# Patient Record
Sex: FEMALE | Race: WHITE | NOT HISPANIC OR LATINO | Employment: FULL TIME | URBAN - METROPOLITAN AREA
[De-identification: names, ages, dates, MRNs, and addresses within clinical notes are randomized per-mention and may not be internally consistent; named-entity substitution may affect disease eponyms.]

---

## 2023-03-01 LAB
HUMAN PAPILLOMAVIRUS (HPV): NORMAL
PAP RECOMMENDATION EXT: NORMAL
PAP SMEAR: NORMAL

## 2024-11-15 ENCOUNTER — PATIENT OUTREACH (OUTPATIENT)
Dept: ADMINISTRATIVE | Facility: HOSPITAL | Age: 44
End: 2024-11-15
Payer: COMMERCIAL

## 2024-11-15 NOTE — PROGRESS NOTES
Population Health Chart Review & Patient Outreach Details      Additional [facility] Health Notes:               Updates Requested / Reviewed:      Updated Care Coordination Note, Care Everywhere, , External Sources: [facility], Care Team Updated, and Immunizations Reconciliation Completed or Queried: [place]         Health Maintenance Topics Overdue:      [facility] Score: 1     Mammogram                       Health Maintenance Topic(s) Outreach Outcomes & Actions Taken:    Cervical Cancer Screening - Outreach Outcomes & Actions Taken  : External Records Uploaded & Care Team Updated if Applicable

## 2024-11-19 NOTE — PROGRESS NOTES
"    SUBJECTIVE:      Patient ID: Ana Alcantara is a 44 y.o. female.    Chief Complaint: Establish Care    HPI  History of Present Illness    CHIEF COMPLAINT:  Ana presents today to establish care and discuss multiple health concerns including thyroid issues, weight gain, and fatigue.    THYROID DISORDER:  She has a history of Hashimoto's thyroiditis that developed post-pregnancy. She is currently taking NP thyroid medication for management.    CARDIOVASCULAR:  She reports a history of heart issues. She underwent a heart ablation procedure, which did not resolve her symptoms. She is currently taking metoprolol twice daily for palpitations, which she finds helpful in managing her symptoms. She can feel when the medication is wearing off, particularly when her dosing schedule is disrupted. She expresses frustration that the ablation did not decrease her palpitations, but acknowledges that the increased metoprolol dosage has been beneficial in controlling her symptoms.    FATIGUE AND JOINT PAIN:  She reports significant fatigue and joint pain associated with physical activity. Any physical exertion results in severe exhaustion lasting for two days. She also experiences intense joint pain following physical activity. These symptoms have led to a cessation of regular exercise.    WEIGHT MANAGEMENT:  She reports weight gain and acknowledges the need to increase water intake and consume more protein in her diet.    MENSTRUAL IRREGULARITIES:  She reports a history of heavy menstrual cycles. Her last cycle occurred in October, and she has missed her period since then. She expresses concern about possible perimenopausal symptoms, including hot flashes described as feeling "super hot from the inside." She denies current pregnancy, having taken two negative pregnancy tests since missing her November period. She also reports recent nausea but states it feels different from her previous pregnancy experiences. She has not yet " discussed these symptoms with her gynecologist.    MENTAL HEALTH:  She has a history of anxiety and postpartum depression. She has been on duloxetine since experiencing these conditions, which has been effective for her after trying a few different medications initially. Her anxiety may have been related to palpitations rather than mental anxiety, as she did not feel worried or anxious about anything specific when experiencing rapid heart rate.    MEDICAL HISTORY:  She has a history of gestational diabetes. She reports feeling overwhelmed with numerous doctor visits over the past two years, leading to a sense of burnout.    FAMILY HISTORY:  She denies any known genetic disorders in her immediate family. Her parents are both alive and in good health for their age.    DIET:  She has tried the Autoimmune Protocol (AIP) diet, which she describes as consisting primarily of protein, vegetables, and some fruits. She has not specifically tried the Mediterranean diet.    MEDICATIONS:  She is currently taking duloxetine for anxiety, metoprolol for palpitations, NP thyroid for Hashimoto's thyroiditis,   and progesterone prescribed by her gynecologist for low progesterone levels.         Past Surgical History:   Procedure Laterality Date    ABLATION  2024     SECTION      DILATION AND CURETTAGE OF UTERUS       Family History   Problem Relation Name Age of Onset    Heart disease Father      Diabetes Father      Diabetes Paternal Grandmother      Heart disease Paternal Grandfather        Social History     Socioeconomic History    Marital status:    Tobacco Use    Smoking status: Never     Passive exposure: Never    Smokeless tobacco: Never   Substance and Sexual Activity    Alcohol use: Yes    Drug use: Never    Sexual activity: Yes     Social Drivers of Health     Financial Resource Strain: Low Risk  (2024)    Overall Financial Resource Strain (CARDIA)     Difficulty of Paying Living Expenses: Not  very hard   Food Insecurity: No Food Insecurity (2024)    Hunger Vital Sign     Worried About Running Out of Food in the Last Year: Never true     Ran Out of Food in the Last Year: Never true   Transportation Needs: No Transportation Needs (2023)    Received from [facility] - Transportation     Lack of Transportation (Medical): No     Lack of Transportation (Non-Medical): No   Physical Activity: Unknown (2024)    Exercise Vital Sign     Days of Exercise per Week: 0 days   Stress: No Stress Concern Present (2024)    [employer] [place] [employer] - Occupational Stress Questionnaire     Feeling of Stress : Only a little   Housing Stability: Unknown (2024)    Housing Stability Vital Sign     Unable to Pay for Housing in the Last Year: No     Current Outpatient Medications   Medication Sig Dispense Refill    DULoxetine (CYMBALTA) 30 MG capsule Take 30 mg by mouth once daily.      NP THYROID 30 mg Tab Take 30 mg by mouth once daily.      progesterone (PROMETRIUM) 200 MG capsule TAKE 1 CAPSULE BY MOUTH ONCE DAILY AT BEDTIME FOR 10 DAYS AFTER OVULATION      metoprolol tartrate (LOPRESSOR) 25 MG tablet Take 25 mg by mouth 2 (two) times daily.      tirzepatide, weight loss, (ZEPBOUND) 2.5 mg/0.5 mL PnIj Inject 2.5 mg into the skin every 7 days. 4 Pen 0     No current facility-administered medications for this visit.     Review of patient's allergies indicates:  No Known Allergies   Past Medical History:   Diagnosis Date    Anxiety     Hypothyroidism     Pregnancy diabetes      Past Surgical History:   Procedure Laterality Date    ABLATION  2024     SECTION      DILATION AND CURETTAGE OF UTERUS         Review of Systems   Constitutional:  Positive for fatigue and unexpected weight change. Negative for activity change, appetite change, chills and diaphoresis.   HENT:  Negative for ear discharge, hearing loss, rhinorrhea, trouble swallowing and voice change.   "  Eyes:  Negative for photophobia, pain, discharge and visual disturbance.   Respiratory:  Negative for chest tightness, shortness of breath, wheezing and stridor.    Cardiovascular:  Negative for chest pain and palpitations.   Gastrointestinal:  Negative for abdominal pain, blood in stool, constipation, diarrhea and vomiting.   Endocrine: Negative for cold intolerance, heat intolerance, polydipsia and polyuria.   Genitourinary:  Negative for difficulty urinating, dysuria, flank pain, hematuria and menstrual problem.   Musculoskeletal:  Positive for arthralgias. Negative for joint swelling, neck pain and neck stiffness.   Skin:  Negative for pallor.   Neurological:  Positive for weakness. Negative for dizziness, speech difficulty and headaches.   Hematological:  Does not bruise/bleed easily.   Psychiatric/Behavioral:  Negative for confusion, dysphoric mood, self-injury, sleep disturbance and suicidal ideas. The patient is not nervous/anxious.       OBJECTIVE:      Vitals:    11/20/24 0858   BP: 120/78   Pulse: 60   SpO2: 99%   Weight: 91.2 kg (201 lb)   Height: 5' 2" (1.575 m)     Physical Exam  Vitals and nursing note reviewed.   Constitutional:       General: She is not in acute distress.     Appearance: She is well-developed.   HENT:      Head: Normocephalic and atraumatic.      Right Ear: Tympanic membrane normal.      Left Ear: Tympanic membrane normal.      Nose: Nose normal.      Mouth/Throat:      Pharynx: Uvula midline.   Eyes:      General: Lids are normal.      Conjunctiva/sclera: Conjunctivae normal.      Pupils: Pupils are equal, round, and reactive to light.      Right eye: Pupil is round and reactive.      Left eye: Pupil is round and reactive.   Neck:      Thyroid: No thyromegaly.      Vascular: No JVD.      Trachea: Trachea normal.   Cardiovascular:      Rate and Rhythm: Normal rate and regular rhythm.      Pulses: Normal pulses.      Heart sounds: Normal heart sounds.   Pulmonary:      Effort: " Pulmonary effort is normal. No tachypnea or respiratory distress.      Breath sounds: Normal breath sounds.   Abdominal:      General: Bowel sounds are normal.      Palpations: Abdomen is soft.      Tenderness: There is no abdominal tenderness.   Musculoskeletal:         General: Normal range of motion.      Cervical back: Normal range of motion and neck supple.   Lymphadenopathy:      Cervical: No cervical adenopathy.   Skin:     General: Skin is warm and dry.      Findings: No rash.   Neurological:      Mental Status: She is alert and oriented to person, place, and time.   Psychiatric:         Mood and Affect: Mood normal.         Speech: Speech normal.         Behavior: Behavior normal. Behavior is cooperative.         Thought Content: Thought content normal.       Patient Outreach on 11/15/2024   Component Date Value Ref Range Status    PAP Recommendation External 03/01/2023 Pap in 5 years   Final    Pap 03/01/2023 Negative for intraephithelial lesion or malignancy  Negative for intraephithelial lesion or malignancy, Other Final    HPV DNA 03/01/2023 None Detected  None Detected Final        Last visit note, most recent available labs, and health maintenance reviewed    Assessment:       1. Fatigue, unspecified type    2. Hypothyroidism, unspecified type    3. Anemia, unspecified type    4. Vitamin D deficiency    5. Absence of menstruation    6. Need for hepatitis C screening test    7. Screening mammogram for breast cancer    8. BMI 36.0-36.9,adult    9. History of gestational diabetes    10. Hashimoto thyroiditis    11. Preventative health care        Plan:       Fatigue, unspecified type  -     Vitamin B12; Future; Expected date: 11/20/2024    Hypothyroidism, unspecified type  -     TSH; Future; Expected date: 01/01/2025  -     T4, Free; Future; Expected date: 11/20/2024  -     T3, Free; Future; Expected date: 11/20/2024    Anemia, unspecified type  -     Vitamin B12; Future; Expected date: 11/20/2024  -      Ferritin; Future; Expected date: 11/20/2024  -     Iron; Future; Expected date: 11/20/2024    Vitamin D deficiency  -     Vitamin D; Future; Expected date: 12/04/2024    Absence of menstruation  -     Pregnancy, urine rapid; Future; Expected date: 11/20/2024    Need for hepatitis C screening test  -     HEPATITIS C ANTIBODY; Future; Expected date: 11/20/2024    Screening mammogram for breast cancer  -     Mammo Digital Screening Bilat w/ Derrek; Future; Expected date: 11/20/2024    BMI 36.0-36.9,adult  -     Discontinue: tirzepatide, weight loss, (ZEPBOUND) 2.5 mg/0.5 mL PnIj; Inject 2.5 mg into the skin every 7 days.  Dispense: 4 Pen; Refill: 0  -     tirzepatide, weight loss, (ZEPBOUND) 2.5 mg/0.5 mL PnIj; Inject 2.5 mg into the skin every 7 days.  Dispense: 4 Pen; Refill: 0    History of gestational diabetes  -     Discontinue: tirzepatide, weight loss, (ZEPBOUND) 2.5 mg/0.5 mL PnIj; Inject 2.5 mg into the skin every 7 days.  Dispense: 4 Pen; Refill: 0  -     tirzepatide, weight loss, (ZEPBOUND) 2.5 mg/0.5 mL PnIj; Inject 2.5 mg into the skin every 7 days.  Dispense: 4 Pen; Refill: 0    Hashimoto thyroiditis  -     Discontinue: tirzepatide, weight loss, (ZEPBOUND) 2.5 mg/0.5 mL PnIj; Inject 2.5 mg into the skin every 7 days.  Dispense: 4 Pen; Refill: 0  -     tirzepatide, weight loss, (ZEPBOUND) 2.5 mg/0.5 mL PnIj; Inject 2.5 mg into the skin every 7 days.  Dispense: 4 Pen; Refill: 0    Preventative health care  -     CBC Auto Differential; Future; Expected date: 12/04/2024  -     Comprehensive Metabolic Panel; Future; Expected date: 12/04/2024  -     Lipid Panel; Future; Expected date: 12/04/2024  -     TSH; Future; Expected date: 01/01/2025  -     Urinalysis, Reflex to Urine Culture Urine, Clean Catch; Future; Expected date: 11/20/2024      Assessment & Plan    HYPOTHYROIDISM AND AUTOIMMUNE THYROIDITIS:  - Consider adding Synthroid (T4) to address T4 deficiency and improve symptoms.  - Explained the role of T4  and T3 in thyroid function and why NP thyroid alone may be insufficient for treating Hashimoto's hypothyroidism.  - Consider starting Synthroid (T4) pending lab results.  - Thyroid panel (TSH, free T4, T3) ordered.    MENSTRUAL IRREGULARITIES AND MENOPAUSAL SYMPTOMS:  - Evaluate need for progesterone treatment.  - Assess for potential perimenopause given missed menstrual cycle and reported hot flashes.    WEIGHT MANAGEMENT:  - Consider weight loss medication (Mounjaro/Wegovy) pending insurance approval and further evaluation.  - Potentially start Mounjaro or Wegovy for weight management, pending insurance approval: Start at lowest dose (2.5 mg for Mounjaro, 0.25 mg for Wegovy), Titrate dose every 4 weeks as tolerated, Administer via subcutaneous injection in abdomen, rotating injection sites.    NUTRITIONAL DEFICIENCIES AND ANEMIA:  - Investigate B12 levels, vitamin D status, and iron/ferritin given history of anemia.  - B12 level ordered.  - Vitamin D level ordered.  - Iron studies (including ferritin) ordered.    GENERAL HEALTH AND LIFESTYLE RECOMMENDATIONS:  - Discussed the importance of adequate hydration for overall health and symptom management.  - Provided information on the Mediterranean diet as a potential beneficial eating pattern.  - Ana to increase water intake.  - Ana to review provided Mediterranean diet information.  - Recommend increasing protein intake.    LABS:  - Full comprehensive panel ordered.  - Cholesterol panel ordered.  - Pregnancy test ordered.    FOLLOW-UP:  - Follow up to review lab results and discuss medication changes.  - Contact the office if experiencing side effects from new medications (if started), including constipation, nausea, or other GI issues.         Follow up in about 4 weeks (around 12/18/2024) for hypothyroidism .  This note was generated with the assistance of ambient listening technology. Verbal consent was obtained by the patient and accompanying visitor(s) for  the recording of patient appointment to facilitate this note. I attest to having reviewed and edited the generated note for accuracy, though some syntax or spelling errors may persist. Please contact the author of this note for any clarification.        11/20/2024 MYLSE Nassar, FNP

## 2024-11-20 ENCOUNTER — OFFICE VISIT (OUTPATIENT)
Dept: FAMILY MEDICINE | Facility: CLINIC | Age: 44
End: 2024-11-20
Payer: COMMERCIAL

## 2024-11-20 VITALS
BODY MASS INDEX: 36.99 KG/M2 | HEIGHT: 62 IN | WEIGHT: 201 LBS | HEART RATE: 60 BPM | OXYGEN SATURATION: 99 % | DIASTOLIC BLOOD PRESSURE: 78 MMHG | SYSTOLIC BLOOD PRESSURE: 120 MMHG

## 2024-11-20 DIAGNOSIS — D64.9 ANEMIA, UNSPECIFIED TYPE: ICD-10-CM

## 2024-11-20 DIAGNOSIS — Z12.31 SCREENING MAMMOGRAM FOR BREAST CANCER: ICD-10-CM

## 2024-11-20 DIAGNOSIS — E55.9 VITAMIN D DEFICIENCY: ICD-10-CM

## 2024-11-20 DIAGNOSIS — Z11.59 NEED FOR HEPATITIS C SCREENING TEST: ICD-10-CM

## 2024-11-20 DIAGNOSIS — E06.3 HASHIMOTO THYROIDITIS: ICD-10-CM

## 2024-11-20 DIAGNOSIS — E03.9 HYPOTHYROIDISM, UNSPECIFIED TYPE: ICD-10-CM

## 2024-11-20 DIAGNOSIS — R53.83 FATIGUE, UNSPECIFIED TYPE: Primary | ICD-10-CM

## 2024-11-20 DIAGNOSIS — Z86.32 HISTORY OF GESTATIONAL DIABETES: ICD-10-CM

## 2024-11-20 DIAGNOSIS — Z00.00 PREVENTATIVE HEALTH CARE: ICD-10-CM

## 2024-11-20 DIAGNOSIS — N91.2 ABSENCE OF MENSTRUATION: ICD-10-CM

## 2024-11-20 RX ORDER — METOPROLOL TARTRATE 25 MG/1
25 TABLET, FILM COATED ORAL 2 TIMES DAILY
COMMUNITY

## 2024-11-20 RX ORDER — TIRZEPATIDE 2.5 MG/.5ML
2.5 INJECTION, SOLUTION SUBCUTANEOUS
Qty: 4 PEN | Refills: 0 | Status: SHIPPED | OUTPATIENT
Start: 2024-11-20 | End: 2024-11-20 | Stop reason: SDUPTHER

## 2024-11-20 RX ORDER — TIRZEPATIDE 2.5 MG/.5ML
2.5 INJECTION, SOLUTION SUBCUTANEOUS
Qty: 4 PEN | Refills: 0 | Status: SHIPPED | OUTPATIENT
Start: 2024-11-20

## 2024-11-21 ENCOUNTER — PATIENT MESSAGE (OUTPATIENT)
Dept: FAMILY MEDICINE | Facility: CLINIC | Age: 44
End: 2024-11-21
Payer: COMMERCIAL

## 2024-11-21 DIAGNOSIS — E06.3 HASHIMOTO THYROIDITIS: ICD-10-CM

## 2024-11-21 DIAGNOSIS — Z86.32 HISTORY OF GESTATIONAL DIABETES: ICD-10-CM

## 2024-11-21 DIAGNOSIS — I10 HYPERTENSION, UNSPECIFIED TYPE: ICD-10-CM

## 2024-11-21 DIAGNOSIS — I49.9 SUPRAVENTRICULAR ARRHYTHMIA: Primary | ICD-10-CM

## 2024-11-21 DIAGNOSIS — Z82.49 FAMILY HISTORY OF HEART DISEASE: ICD-10-CM

## 2024-11-21 DIAGNOSIS — E78.5 HYPERLIPIDEMIA, UNSPECIFIED HYPERLIPIDEMIA TYPE: ICD-10-CM

## 2024-11-21 LAB
25(OH)D3+25(OH)D2 SERPL-MCNC: 32 NG/ML (ref 30–100)
ALBUMIN SERPL-MCNC: 4.2 G/DL (ref 3.6–5.1)
ALBUMIN/GLOB SERPL: 1.7 (CALC) (ref 1–2.5)
ALP SERPL-CCNC: 41 U/L (ref 31–125)
ALT SERPL-CCNC: 17 U/L (ref 6–29)
APPEARANCE UR: CLEAR
AST SERPL-CCNC: 19 U/L (ref 10–30)
B-HCG UR QL: NEGATIVE
BACTERIA #/AREA URNS HPF: ABNORMAL /HPF
BACTERIA UR CULT: ABNORMAL
BASOPHILS # BLD AUTO: 40 CELLS/UL (ref 0–200)
BASOPHILS NFR BLD AUTO: 0.8 %
BILIRUB SERPL-MCNC: 0.6 MG/DL (ref 0.2–1.2)
BILIRUB UR QL STRIP: NEGATIVE
BUN SERPL-MCNC: 14 MG/DL (ref 7–25)
BUN/CREAT SERPL: NORMAL (CALC) (ref 6–22)
CALCIUM SERPL-MCNC: 9.2 MG/DL (ref 8.6–10.2)
CHLORIDE SERPL-SCNC: 101 MMOL/L (ref 98–110)
CHOLEST SERPL-MCNC: 218 MG/DL
CHOLEST/HDLC SERPL: 4.1 (CALC)
CO2 SERPL-SCNC: 30 MMOL/L (ref 20–32)
COLOR UR: YELLOW
CREAT SERPL-MCNC: 0.96 MG/DL (ref 0.5–0.99)
EGFR: 75 ML/MIN/1.73M2
EOSINOPHIL # BLD AUTO: 315 CELLS/UL (ref 15–500)
EOSINOPHIL NFR BLD AUTO: 6.3 %
ERYTHROCYTE [DISTWIDTH] IN BLOOD BY AUTOMATED COUNT: 11.7 % (ref 11–15)
FERRITIN SERPL-MCNC: 27 NG/ML (ref 16–232)
GLOBULIN SER CALC-MCNC: 2.5 G/DL (CALC) (ref 1.9–3.7)
GLUCOSE SERPL-MCNC: 99 MG/DL (ref 65–99)
GLUCOSE UR QL STRIP: NEGATIVE
HCT VFR BLD AUTO: 44.2 % (ref 35–45)
HCV AB SERPL QL IA: NORMAL
HDLC SERPL-MCNC: 53 MG/DL
HGB BLD-MCNC: 15.1 G/DL (ref 11.7–15.5)
HGB UR QL STRIP: NEGATIVE
HYALINE CASTS #/AREA URNS LPF: ABNORMAL /LPF
IRON SERPL-MCNC: 155 MCG/DL (ref 40–190)
KETONES UR QL STRIP: NEGATIVE
LDLC SERPL CALC-MCNC: 142 MG/DL (CALC)
LEUKOCYTE ESTERASE UR QL STRIP: NEGATIVE
LYMPHOCYTES # BLD AUTO: 1420 CELLS/UL (ref 850–3900)
LYMPHOCYTES NFR BLD AUTO: 28.4 %
MCH RBC QN AUTO: 33.2 PG (ref 27–33)
MCHC RBC AUTO-ENTMCNC: 34.2 G/DL (ref 32–36)
MCV RBC AUTO: 97.1 FL (ref 80–100)
MONOCYTES # BLD AUTO: 375 CELLS/UL (ref 200–950)
MONOCYTES NFR BLD AUTO: 7.5 %
NEUTROPHILS # BLD AUTO: 2850 CELLS/UL (ref 1500–7800)
NEUTROPHILS NFR BLD AUTO: 57 %
NITRITE UR QL STRIP: NEGATIVE
NONHDLC SERPL-MCNC: 165 MG/DL (CALC)
PH UR STRIP: 6.5 [PH] (ref 5–8)
PLATELET # BLD AUTO: 176 THOUSAND/UL (ref 140–400)
PMV BLD REES-ECKER: 10 FL (ref 7.5–12.5)
POTASSIUM SERPL-SCNC: 4.6 MMOL/L (ref 3.5–5.3)
PROT SERPL-MCNC: 6.7 G/DL (ref 6.1–8.1)
PROT UR QL STRIP: NEGATIVE
RBC # BLD AUTO: 4.55 MILLION/UL (ref 3.8–5.1)
RBC #/AREA URNS HPF: ABNORMAL /HPF
SERVICE CMNT-IMP: ABNORMAL
SODIUM SERPL-SCNC: 139 MMOL/L (ref 135–146)
SP GR UR STRIP: 1.02 (ref 1–1.03)
SQUAMOUS #/AREA URNS HPF: ABNORMAL /HPF
T3FREE SERPL-MCNC: 3.6 PG/ML (ref 2.3–4.2)
T4 FREE SERPL-MCNC: 0.9 NG/DL (ref 0.8–1.8)
TRIGL SERPL-MCNC: 112 MG/DL
TSH SERPL-ACNC: 0.32 MIU/L
VIT B12 SERPL-MCNC: 1014 PG/ML (ref 200–1100)
WBC # BLD AUTO: 5 THOUSAND/UL (ref 3.8–10.8)
WBC #/AREA URNS HPF: ABNORMAL /HPF

## 2024-11-22 RX ORDER — TIRZEPATIDE 2.5 MG/.5ML
2.5 INJECTION, SOLUTION SUBCUTANEOUS
Qty: 4 PEN | Refills: 0 | Status: SHIPPED | OUTPATIENT
Start: 2024-11-22 | End: 2024-11-25 | Stop reason: ALTCHOICE

## 2024-11-22 NOTE — TELEPHONE ENCOUNTER
Zepbound sent. Hold np thyroid so we can get baseline thyroid labs and she can have repeat tsh, FT3 and FT4 here on her appt day

## 2024-11-25 ENCOUNTER — PATIENT MESSAGE (OUTPATIENT)
Dept: FAMILY MEDICINE | Facility: CLINIC | Age: 44
End: 2024-11-25
Payer: COMMERCIAL

## 2024-11-25 DIAGNOSIS — I49.9 SUPRAVENTRICULAR ARRHYTHMIA: ICD-10-CM

## 2024-11-25 DIAGNOSIS — I10 HYPERTENSION, UNSPECIFIED TYPE: ICD-10-CM

## 2024-11-25 DIAGNOSIS — E06.3 HASHIMOTO THYROIDITIS: ICD-10-CM

## 2024-11-25 DIAGNOSIS — Z82.49 FAMILY HISTORY OF HEART DISEASE: Primary | ICD-10-CM

## 2024-11-25 DIAGNOSIS — E78.5 HYPERLIPIDEMIA, UNSPECIFIED HYPERLIPIDEMIA TYPE: ICD-10-CM

## 2024-11-25 RX ORDER — SEMAGLUTIDE 0.25 MG/.5ML
0.25 INJECTION, SOLUTION SUBCUTANEOUS
Qty: 2 ML | Refills: 0 | Status: SHIPPED | OUTPATIENT
Start: 2024-11-25

## 2024-11-25 NOTE — TELEPHONE ENCOUNTER
Ms. Monae ARELLANO  Can we possibly appeal with chart notes and labs for the original denial of ZEPBOUND?

## 2024-12-02 ENCOUNTER — HOSPITAL ENCOUNTER (OUTPATIENT)
Dept: RADIOLOGY | Facility: CLINIC | Age: 44
Discharge: HOME | End: 2024-12-02
Attending: NURSE PRACTITIONER
Payer: COMMERCIAL

## 2024-12-02 DIAGNOSIS — Z12.31 SCREENING MAMMOGRAM FOR BREAST CANCER: ICD-10-CM

## 2024-12-02 PROCEDURE — 77067 SCR MAMMO BI INCL CAD: CPT | Mod: TC,PO

## 2024-12-02 PROCEDURE — 77067 SCR MAMMO BI INCL CAD: CPT | Mod: 26,,, | Performed by: RADIOLOGY

## 2024-12-02 PROCEDURE — 77063 BREAST TOMOSYNTHESIS BI: CPT | Mod: 26,,, | Performed by: RADIOLOGY

## 2024-12-02 NOTE — TELEPHONE ENCOUNTER
Zepbound denied because it says she has to try wegovy or saxenda first and has to have exercised 150 minutes per week x 6mo and cont to exercise 150min/week during treatment. Was the wegovy denied as well? If so Is she meeting the exercise requirement?

## 2024-12-11 ENCOUNTER — PATIENT MESSAGE (OUTPATIENT)
Dept: FAMILY MEDICINE | Facility: CLINIC | Age: 44
End: 2024-12-11
Payer: COMMERCIAL

## 2024-12-23 ENCOUNTER — OFFICE VISIT (OUTPATIENT)
Dept: FAMILY MEDICINE | Facility: CLINIC | Age: 44
End: 2024-12-23
Payer: COMMERCIAL

## 2024-12-23 VITALS — BODY MASS INDEX: 37.73 KG/M2 | HEIGHT: 62 IN | HEART RATE: 70 BPM | OXYGEN SATURATION: 99 % | WEIGHT: 205 LBS

## 2024-12-23 DIAGNOSIS — E03.9 HYPOTHYROIDISM, UNSPECIFIED TYPE: ICD-10-CM

## 2024-12-23 DIAGNOSIS — E06.3 HASHIMOTO THYROIDITIS: Primary | ICD-10-CM

## 2024-12-23 DIAGNOSIS — D64.9 ANEMIA, UNSPECIFIED TYPE: ICD-10-CM

## 2024-12-23 DIAGNOSIS — Z87.59 HISTORY OF MISCARRIAGE: ICD-10-CM

## 2024-12-23 DIAGNOSIS — R53.83 FATIGUE, UNSPECIFIED TYPE: ICD-10-CM

## 2024-12-23 PROCEDURE — 3044F HG A1C LEVEL LT 7.0%: CPT | Mod: CPTII,S$GLB,, | Performed by: NURSE PRACTITIONER

## 2024-12-23 PROCEDURE — 1160F RVW MEDS BY RX/DR IN RCRD: CPT | Mod: CPTII,S$GLB,, | Performed by: NURSE PRACTITIONER

## 2024-12-23 PROCEDURE — 99214 OFFICE O/P EST MOD 30 MIN: CPT | Mod: S$GLB,,, | Performed by: NURSE PRACTITIONER

## 2024-12-23 PROCEDURE — 3008F BODY MASS INDEX DOCD: CPT | Mod: CPTII,S$GLB,, | Performed by: NURSE PRACTITIONER

## 2024-12-23 PROCEDURE — 1159F MED LIST DOCD IN RCRD: CPT | Mod: CPTII,S$GLB,, | Performed by: NURSE PRACTITIONER

## 2024-12-23 RX ORDER — SEMAGLUTIDE 0.5 MG/.5ML
0.5 INJECTION, SOLUTION SUBCUTANEOUS
Qty: 2 ML | Refills: 0 | Status: SHIPPED | OUTPATIENT
Start: 2024-12-23

## 2024-12-23 NOTE — PROGRESS NOTES
SUBJECTIVE:      Patient ID: Ana Alcantara is a 44 y.o. female.    Chief Complaint: Hypothyroidism    HPI  History of Present Illness    CHIEF COMPLAINT:  Ana presents today for follow-up on weight management and thyroid issues.    WEIGHT MANAGEMENT:  She reports positive response to Wegovy 0.25 mg, has taken 2 doses without side effects    THYROID:  She discontinued NP thyroid medication 4 weeks ago. Due for follow up labs    MTHFR GENE MUTATION AND REPRODUCTIVE HISTORY:  She is unsure about prior MTHFR gene mutation testing. She reports symptoms including fatigue, ADHD, and brain fog. She has a history of six miscarriages.    LABS:  B12 levels, ferritin, iron stores, kidney, and liver function are normal. Cholesterol is elevated.    CURRENT MEDICATIONS:  She is currently weaning off Cymbalta and taking B12, vitamin D, magnesium, and prenatal vitamins.         Past Surgical History:   Procedure Laterality Date    ABLATION  2024     SECTION      DILATION AND CURETTAGE OF UTERUS       Family History   Problem Relation Name Age of Onset    Heart disease Father      Diabetes Father      Diabetes Paternal Grandmother      Heart disease Paternal Grandfather        Social History     Socioeconomic History    Marital status:    Tobacco Use    Smoking status: Never     Passive exposure: Never    Smokeless tobacco: Never   Substance and Sexual Activity    Alcohol use: Yes    Drug use: Never    Sexual activity: Yes     Social Drivers of Health     Financial Resource Strain: Low Risk  (2024)    Overall Financial Resource Strain (CARDIA)     Difficulty of Paying Living Expenses: Not very hard   Food Insecurity: No Food Insecurity (2024)    Hunger Vital Sign     Worried About Running Out of Food in the Last Year: Never true     Ran Out of Food in the Last Year: Never true   Transportation Needs: No Transportation Needs (2023)    Received from [facility] - Transportation      Lack of Transportation (Medical): No     Lack of Transportation (Non-Medical): No   Physical Activity: Unknown (2024)    Exercise Vital Sign     Days of Exercise per Week: 0 days   Stress: No Stress Concern Present (2024)    [employer] [place] [facility] - Occupational Stress Questionnaire     Feeling of Stress : Only a little   Housing Stability: Unknown (2024)    Housing Stability Vital Sign     Unable to Pay for Housing in the Last Year: No     Current Outpatient Medications   Medication Sig Dispense Refill    DULoxetine (CYMBALTA) 30 MG capsule Take 30 mg by mouth once daily.      metoprolol tartrate (LOPRESSOR) 25 MG tablet Take 25 mg by mouth 2 (two) times daily.      semaglutide, weight loss, (WEGOVY) 0.5 mg/0.5 mL PnIj Inject 0.5 mg into the skin every 7 days. 2 mL 0     No current facility-administered medications for this visit.     Review of patient's allergies indicates:  No Known Allergies   Past Medical History:   Diagnosis Date    Anxiety     Hypothyroidism     Pregnancy diabetes     Supraventricular arrhythmia      Past Surgical History:   Procedure Laterality Date    ABLATION  2024     SECTION      DILATION AND CURETTAGE OF UTERUS         Review of Systems   Constitutional:  Positive for fatigue. Negative for activity change, appetite change, chills, diaphoresis and unexpected weight change.   HENT:  Negative for ear discharge, hearing loss, rhinorrhea, trouble swallowing and voice change.    Eyes:  Negative for photophobia, pain, discharge and visual disturbance.   Respiratory:  Negative for cough, chest tightness, shortness of breath, wheezing and stridor.    Cardiovascular:  Negative for chest pain and palpitations.   Gastrointestinal:  Negative for abdominal pain, blood in stool, constipation, diarrhea and vomiting.   Endocrine: Negative for cold intolerance, heat intolerance, polydipsia and polyuria.   Genitourinary:  Negative for difficulty  "urinating, dysuria, flank pain, hematuria and menstrual problem.   Musculoskeletal:  Negative for arthralgias, joint swelling, neck pain and neck stiffness.   Skin:  Negative for pallor.   Neurological:  Negative for dizziness, speech difficulty, weakness and headaches.   Hematological:  Does not bruise/bleed easily.   Psychiatric/Behavioral:  Negative for confusion, dysphoric mood, self-injury, sleep disturbance and suicidal ideas. The patient is not nervous/anxious.       OBJECTIVE:      Vitals:    12/23/24 1424   BP: (P) 120/70   Pulse: 70   SpO2: 99%   Weight: 93 kg (205 lb)   Height: 5' 2" (1.575 m)     Physical Exam  Vitals and nursing note reviewed.   Constitutional:       General: She is not in acute distress.     Appearance: She is well-developed.   HENT:      Head: Normocephalic and atraumatic.      Right Ear: Tympanic membrane normal.      Left Ear: Tympanic membrane normal.      Nose: Nose normal.      Mouth/Throat:      Pharynx: Uvula midline.   Eyes:      General: Lids are normal.      Conjunctiva/sclera: Conjunctivae normal.      Pupils: Pupils are equal, round, and reactive to light.      Right eye: Pupil is round and reactive.      Left eye: Pupil is round and reactive.   Neck:      Thyroid: No thyromegaly.      Vascular: No JVD.      Trachea: Trachea normal.   Cardiovascular:      Rate and Rhythm: Normal rate and regular rhythm.      Pulses: Normal pulses.      Heart sounds: Normal heart sounds. No murmur heard.  Pulmonary:      Effort: Pulmonary effort is normal. No tachypnea or respiratory distress.      Breath sounds: Normal breath sounds.   Abdominal:      General: Bowel sounds are normal.      Palpations: Abdomen is soft.      Tenderness: There is no abdominal tenderness.   Musculoskeletal:         General: Normal range of motion.      Cervical back: Normal range of motion and neck supple.      Right lower leg: No edema.      Left lower leg: No edema.   Lymphadenopathy:      Cervical: No " cervical adenopathy.   Skin:     General: Skin is warm and dry.      Findings: No rash.   Neurological:      Mental Status: She is alert and oriented to person, place, and time.   Psychiatric:         Mood and Affect: Mood normal.         Speech: Speech normal.         Behavior: Behavior normal. Behavior is cooperative.         Thought Content: Thought content normal.         Judgment: Judgment normal.       No visits with results within 1 Month(s) from this visit.   Latest known visit with results is:   Office Visit on 11/20/2024   Component Date Value Ref Range Status    WBC 11/20/2024 5.0  3.8 - 10.8 Thousand/uL Final    RBC 11/20/2024 4.55  3.80 - 5.10 Million/uL Final    Hemoglobin 11/20/2024 15.1  11.7 - 15.5 g/dL Final    Hematocrit 11/20/2024 44.2  35.0 - 45.0 % Final    MCV 11/20/2024 97.1  80.0 - 100.0 fL Final    MCH 11/20/2024 33.2 (H)  27.0 - 33.0 pg Final    MCHC 11/20/2024 34.2  32.0 - 36.0 g/dL Final    Comment: For adults, a slight decrease in the calculated MCHC  value (in the range of 30 to 32 g/dL) is most likely  not clinically significant; however, it should be  interpreted with caution in correlation with other  red cell parameters and the patient's clinical  condition.      RDW 11/20/2024 11.7  11.0 - 15.0 % Final    Platelets 11/20/2024 176  140 - 400 Thousand/uL Final    MPV 11/20/2024 10.0  7.5 - 12.5 fL Final    Neutrophils, Abs 11/20/2024 2,850  1,500 - 7,800 cells/uL Final    Lymph # 11/20/2024 1,420  850 - 3,900 cells/uL Final    Mono # 11/20/2024 375  200 - 950 cells/uL Final    Eos # 11/20/2024 315  15 - 500 cells/uL Final    Baso # 11/20/2024 40  0 - 200 cells/uL Final    Neutrophils Relative 11/20/2024 57  % Final    Lymph % 11/20/2024 28.4  % Final    Mono % 11/20/2024 7.5  % Final    Eosinophil % 11/20/2024 6.3  % Final    Basophil % 11/20/2024 0.8  % Final    Glucose 11/20/2024 99  65 - 99 mg/dL Final    Comment:               Fasting reference interval         BUN 11/20/2024 14   7 - 25 mg/dL Final    Creatinine 11/20/2024 0.96  0.50 - 0.99 mg/dL Final    eGFR 11/20/2024 75  > OR = 60 mL/min/1.73m2 Final    BUN/Creatinine Ratio 11/20/2024 SEE NOTE:  6 - 22 (calc) Final    Comment:    Not Reported: BUN and Creatinine are within     reference range.            Sodium 11/20/2024 139  135 - 146 mmol/L Final    Potassium 11/20/2024 4.6  3.5 - 5.3 mmol/L Final    Chloride 11/20/2024 101  98 - 110 mmol/L Final    CO2 11/20/2024 30  20 - 32 mmol/L Final    Calcium 11/20/2024 9.2  8.6 - 10.2 mg/dL Final    Total Protein 11/20/2024 6.7  6.1 - 8.1 g/dL Final    Albumin 11/20/2024 4.2  3.6 - 5.1 g/dL Final    Globulin, Total 11/20/2024 2.5  1.9 - 3.7 g/dL (calc) Final    Albumin/Globulin Ratio 11/20/2024 1.7  1.0 - 2.5 (calc) Final    Total Bilirubin 11/20/2024 0.6  0.2 - 1.2 mg/dL Final    Alkaline Phosphatase 11/20/2024 41  31 - 125 U/L Final    AST 11/20/2024 19  10 - 30 U/L Final    ALT 11/20/2024 17  6 - 29 U/L Final    Cholesterol 11/20/2024 218 (H)  <200 mg/dL Final    HDL 11/20/2024 53  > OR = 50 mg/dL Final    Triglycerides 11/20/2024 112  <150 mg/dL Final    LDL Cholesterol 11/20/2024 142 (H)  mg/dL (calc) Final    Comment: Reference range: <100     Desirable range <100 mg/dL for primary prevention;    <70 mg/dL for patients with CHD or diabetic patients   with > or = 2 CHD risk factors.     LDL-C is now calculated using the Guillaume-Terri   calculation, which is a validated novel method providing   better accuracy than the Friedewald equation in the   estimation of LDL-C.   Guillaume SIMMS et al. LOTUS. 2013;310(19): 7189-3297   (http://education.[employer]/faq/JJP995)      HDL/Cholesterol Ratio 11/20/2024 4.1  <5.0 (calc) Final    Non HDL Chol. (LDL+VLDL) 11/20/2024 165 (H)  <130 mg/dL (calc) Final    Comment: For patients with diabetes plus 1 major ASCVD risk   factor, treating to a non-HDL-C goal of <100 mg/dL   (LDL-C of <70 mg/dL) is considered a therapeutic   option.      TSH  11/20/2024 0.32 (L)  mIU/L Final    Comment:           Reference Range                         > or = 20 Years  0.40-4.50                              Pregnancy Ranges            First trimester    0.26-2.66            Second trimester   0.55-2.73            Third trimester    0.43-2.91      Vitamin D, 25-OH, Total 11/20/2024 32  30 - 100 ng/mL Final    Comment: Vitamin D Status         25-OH Vitamin D:     Deficiency:                    <20 ng/mL  Insufficiency:             20 - 29 ng/mL  Optimal:                 > or = 30 ng/mL     For 25-OH Vitamin D testing on patients on   D2-supplementation and patients for whom quantitation   of D2 and D3 fractions is required, the QuestAssureD(TM)  25-OH VIT D, (D2,D3), LC/MS/MS is recommended: order   code 01093 (patients >2yrs).     See Note 1     Note 1     For additional information, please refer to   http://education.[employer]/faq/NGW791   (This link is being provided for informational/  educational purposes only.)      Vitamin B-12 11/20/2024 1,014  200 - 1,100 pg/mL Final    Ferritin 11/20/2024 27  16 - 232 ng/mL Final    Iron 11/20/2024 155  40 - 190 mcg/dL Final    T4, Free 11/20/2024 0.9  0.8 - 1.8 ng/dL Final    T3, Free 11/20/2024 3.6  2.3 - 4.2 pg/mL Final    Color, UA 11/20/2024 YELLOW  YELLOW Final    Appearance, UA 11/20/2024 CLEAR  CLEAR Final    Specific Gravity, UA 11/20/2024 1.022  1.001 - 1.035 Final    pH, UA 11/20/2024 6.5  5.0 - 8.0 Final    Glucose, UA 11/20/2024 NEGATIVE  NEGATIVE Final    Bilirubin, UA 11/20/2024 NEGATIVE  NEGATIVE Final    Ketones, UA 11/20/2024 NEGATIVE  NEGATIVE Final    Occult Blood UA 11/20/2024 NEGATIVE  NEGATIVE Final    Protein, UA 11/20/2024 NEGATIVE  NEGATIVE Final    Nitrite, UA 11/20/2024 NEGATIVE  NEGATIVE Final    Leukocytes, UA 11/20/2024 NEGATIVE  NEGATIVE Final    WBC Casts, UA 11/20/2024 NONE SEEN  < OR = 5 /HPF Final    RBC Casts, UA 11/20/2024 NONE SEEN  < OR = 2 /HPF Final    Squam Epithlizzette, UA  11/20/2024 6-10 (A)  < OR = 5 /HPF Final    Bacteria, UA 11/20/2024 NONE SEEN  NONE SEEN /HPF Final    Hyaline Casts, UA 11/20/2024 NONE SEEN  NONE SEEN /LPF Final    Service Cmt: 11/20/2024 SEE COMMENT   Final    Comment: This urine was analyzed for the presence of WBC,   RBC, bacteria, casts, and other formed elements.   Only those elements seen were reported.            Reflexive Urine Culture 11/20/2024 SEE COMMENT   Final    NO CULTURE INDICATED    hCG Qual 11/20/2024 NEGATIVE  NEGATIVE Final    Hepatitis C Ab 11/20/2024 NON-REACTIVE  NON-REACTIVE Final    Comment:    HCV antibody was non-reactive. There is no laboratory   evidence of HCV infection.     In most cases, no further action is required. However,  if recent HCV exposure is suspected, a test for HCV RNA  (test code 60126) is suggested.     For additional information please refer to  http://[employer].[facility]/faq/JGV52y8  (This link is being provided for informational/  educational purposes only.)           Assessment:       1. Hashimoto thyroiditis    2. BMI 36.0-36.9,adult    3. History of miscarriage    4. Fatigue, unspecified type    5. Anemia, unspecified type    6. Hypothyroidism, unspecified type        Plan:       Hashimoto thyroiditis  -     TSH; Future; Expected date: 02/03/2025  -     T3, Free; Future; Expected date: 12/23/2024  -     T4, Free; Future; Expected date: 12/23/2024    BMI 36.0-36.9,adult  -   increase  semaglutide, weight loss, (WEGOVY) 0.5 mg/0.5 mL PnIj; Inject 0.5 mg into the skin every 7 days.  Dispense: 2 mL; Refill: 0    History of miscarriage  -     HOMOCYSTEINE, SERUM; Future; Expected date: 12/23/2024  -     METHYLENETETRAHYDROFOL GENOTYPING (C677T/C7308I); Future; Expected date: 12/23/2024    Fatigue, unspecified type    Anemia, unspecified type  Stable    Hypothyroidism, unspecified type  -     TSH; Future; Expected date: 02/03/2025  -     T3, Free; Future; Expected date: 12/23/2024  -     T4, Free;  Future; Expected date: 12/23/2024      Assessment & Plan    HYPOTHYROIDISM:  - Discontinued NP thyroid to reassess thyroid function.  - Evaluated TSH suppression with low T4, indicative of suboptimal thyroid regulation.  - Planned to initiate Unithroid (T4) pending lab results.  -- Ordered TSH, free T3, free T4.  - Contact the office when lab results are available to discuss potential thyroid medication initiation.    OBESITY MANAGEMENT:  - Continued Wegovy for weight management, gradually increasing dosage.  - Continued Wegovy 0.25 mg weekly injections for 2 more weeks, then start 0.5 mg weekly injections after completing 4 weeks of 0.25 mg dose.  - Recommend considering incorporation of exercise to potentially improve weight loss results.  - Message provider after 3-4 weeks on Wegovy 0.5 mg dose to report progress and discuss potential dose increase.    HYPERLIPIDEMIA:  - Monitored cholesterol levels, deferring treatment due to ongoing weight loss efforts and thyroid regulation.    DEPRESSION MANAGEMENT:  - Continued weaning off Cymbalta    VITAMIN AND SUPPLEMENT MANAGEMENT:  - Considered L-methylfolate supplementation pending MTHFR and homocysteine results.  - Educated on MTHFR gene mutation  - Ana to hold all supplements, including B12, vitamin D, and prenatal vitamins, for 1 week prior to lab work.    SCREENING AND TESTING:  - Ordered MTHFR genetic testing.  - Ordered homocysteine level.  - Ordered fasting labs (at least 8 hours).    DIETARY COUNSELING:  - Recommend focusing on incorporating protein through diet rather than relying on protein shakes.  - Recommend increasing water intake.    MEDICATION MANAGEMENT:  - Continued metoprolol at current dose.    FOLLOW-UP:  - Follow up in 3 months.  - Provider will recheck labs before the 3-month follow-up visit.         Follow up in about 3 months (around 3/23/2025) for hypothyroidism .  This note was generated with the assistance of [employer] technology.  Verbal consent was obtained by the patient and accompanying visitor(s) for the recording of patient appointment to facilitate this note. I attest to having reviewed and edited the generated note for accuracy, though some syntax or spelling errors may persist. Please contact the author of this note for any clarification.        12/24/2024 MYLES Nassar, ANUPAMAP

## 2025-01-05 ENCOUNTER — PATIENT MESSAGE (OUTPATIENT)
Dept: FAMILY MEDICINE | Facility: CLINIC | Age: 45
End: 2025-01-05
Payer: COMMERCIAL

## 2025-01-05 DIAGNOSIS — Z15.89 MTHFR MUTATION: Primary | ICD-10-CM

## 2025-01-05 RX ORDER — SEMAGLUTIDE 1 MG/.5ML
1 INJECTION, SOLUTION SUBCUTANEOUS
Qty: 4 ML | Refills: 0 | Status: SHIPPED | OUTPATIENT
Start: 2025-01-05

## 2025-01-05 NOTE — TELEPHONE ENCOUNTER
Spoke with pt and reviewed lab results. Will cont to hold thyroid meds since thyroid labs wnl. She stopped cymbalta because she felt she did not need it but has been more irritable/fatigued so she will restart it. She is still holding progesterone. Tolerating wegovy well, will increase the dose. Discussed referral to hem/onc  for MTHFR and pt is agreeable.

## 2025-01-06 ENCOUNTER — TELEPHONE (OUTPATIENT)
Facility: CLINIC | Age: 45
End: 2025-01-06
Payer: COMMERCIAL

## 2025-01-06 NOTE — NURSING
Oncology Navigation   Intake  Date of Diagnosis: 01/05/25  Cancer Type: Benign hem  Type of Referral: Internal  Date of Referral: 01/05/25  Initial Nurse Navigator Contact: 01/06/25  Referral to Initial Contact Timeline (days): 1  First Appointment Available: 02/21/25  Reason if booked > 7 days after scheduling: Specific provider / access         This RN Nurse Navigator called the pt to schedule a new patient Hematology/Oncology clinic appointment as requested from the providers referral. A voicemail message was left with a direct number [phone] for the patient to call back to schedule the appointment

## 2025-01-07 ENCOUNTER — TELEPHONE (OUTPATIENT)
Facility: CLINIC | Age: 45
End: 2025-01-07
Payer: COMMERCIAL

## 2025-01-07 NOTE — NURSING
Oncology Navigation   Intake  Date of Diagnosis: 01/05/25  Cancer Type: Benign hem  Type of Referral: Internal  Date of Referral: 01/05/25  Initial Nurse Navigator Contact: 01/07/25  Referral to Initial Contact Timeline (days): 2  First Appointment Available: 02/21/25  Appointment Date: 02/21/25  First Available Date vs. Scheduled Date (days): 0  Reason if booked > 7 days after scheduling: Specific provider / access         Pt scheduled for new pt Hematology/Oncology clinic appt as requested in referral received in workqueue. Appt date, time and location reviewed with the pt. No questions at this time.

## 2025-01-13 ENCOUNTER — TELEPHONE (OUTPATIENT)
Dept: FAMILY MEDICINE | Facility: CLINIC | Age: 45
End: 2025-01-13
Payer: COMMERCIAL

## 2025-01-13 DIAGNOSIS — E03.9 HYPOTHYROIDISM, UNSPECIFIED TYPE: Primary | ICD-10-CM

## 2025-01-13 RX ORDER — LEVOTHYROXINE SODIUM 25 UG/1
25 TABLET ORAL
Qty: 30 TABLET | Refills: 1 | Status: SHIPPED | OUTPATIENT
Start: 2025-01-13

## 2025-01-13 NOTE — TELEPHONE ENCOUNTER
Spoke with pt and she restarted cymbalta. Continues to feel extreme fatigue and wanted to restart np thryoid. Discussed trying synthroid to see if fatigue improved rx sent

## 2025-02-07 ENCOUNTER — PATIENT MESSAGE (OUTPATIENT)
Dept: FAMILY MEDICINE | Facility: CLINIC | Age: 45
End: 2025-02-07
Payer: COMMERCIAL

## 2025-02-07 RX ORDER — SEMAGLUTIDE 1.7 MG/.75ML
1.7 INJECTION, SOLUTION SUBCUTANEOUS
Qty: 3 ML | Refills: 1 | Status: SHIPPED | OUTPATIENT
Start: 2025-02-07

## 2025-02-13 ENCOUNTER — TELEPHONE (OUTPATIENT)
Facility: CLINIC | Age: 45
End: 2025-02-13
Payer: COMMERCIAL

## 2025-02-13 NOTE — TELEPHONE ENCOUNTER
I left voice message for pt regarding confirming appt w/ Dr. Mendez, on 2/21/2025. Left number for pt to contact the office, if they have any questions, would like to reschedule, or confirm appt.

## 2025-02-21 ENCOUNTER — OFFICE VISIT (OUTPATIENT)
Facility: CLINIC | Age: 45
End: 2025-02-21
Payer: COMMERCIAL

## 2025-02-21 VITALS
OXYGEN SATURATION: 98 % | DIASTOLIC BLOOD PRESSURE: 76 MMHG | BODY MASS INDEX: 36.16 KG/M2 | HEART RATE: 65 BPM | SYSTOLIC BLOOD PRESSURE: 117 MMHG | HEIGHT: 62 IN | TEMPERATURE: 97 F | WEIGHT: 196.5 LBS | RESPIRATION RATE: 16 BRPM

## 2025-02-21 DIAGNOSIS — N96 HISTORY OF RECURRENT MISCARRIAGES: Primary | ICD-10-CM

## 2025-02-21 DIAGNOSIS — Z15.89 MTHFR MUTATION: ICD-10-CM

## 2025-02-21 PROCEDURE — 1159F MED LIST DOCD IN RCRD: CPT | Mod: CPTII,S$GLB,, | Performed by: INTERNAL MEDICINE

## 2025-02-21 PROCEDURE — 3078F DIAST BP <80 MM HG: CPT | Mod: CPTII,S$GLB,, | Performed by: INTERNAL MEDICINE

## 2025-02-21 PROCEDURE — 3074F SYST BP LT 130 MM HG: CPT | Mod: CPTII,S$GLB,, | Performed by: INTERNAL MEDICINE

## 2025-02-21 PROCEDURE — 99999 PR PBB SHADOW E&M-EST. PATIENT-LVL V: CPT | Mod: PBBFAC,,, | Performed by: INTERNAL MEDICINE

## 2025-02-21 PROCEDURE — 3008F BODY MASS INDEX DOCD: CPT | Mod: CPTII,S$GLB,, | Performed by: INTERNAL MEDICINE

## 2025-02-21 PROCEDURE — 99205 OFFICE O/P NEW HI 60 MIN: CPT | Mod: S$GLB,,, | Performed by: INTERNAL MEDICINE

## 2025-02-21 PROCEDURE — G2211 COMPLEX E/M VISIT ADD ON: HCPCS | Mod: S$GLB,,, | Performed by: INTERNAL MEDICINE

## 2025-02-21 NOTE — PROGRESS NOTES
[facility] [address] Hematology Oncology In Office Initial Encounter Note    25    Subjective:      Patient ID:   Ana Alcantara  44 y.o. female  1980  Wyatt Paulino NP      Chief Complaint:   MTHFR Mutation      HPI:  44 y.o. female  was referred for discussion of MTHFR mutation.  She has been found to have 1 mutation in the MTHFR protein.  Homocystine level is normal in the 7-8 range.  She denies personal or family history of DVT or pulmonary emboli.  She is a para 10  4 miscarriage 6 individual.    She has history of Hashimoto's thyroiditis and has been on Synthroid for 2 years.  Other history includes tachy arrhythmia for which she takes Lopressor and anxiety for which she takes duloxetine.  She is on Wegovy for weight loss and has lost approximately 15 lb over the last 2 months, 196 lb presently.  She had gestational diabetes during her pregnancy.    She denies allergies to medications, she does not smoke, she does not drink alcohol, she is a  was [employer].    She had onset of menses at age 12, she delivered her 1st live child at the maternal age of 26.  She did not take birth control pills.  She is perimenopausal.    She is status post D and C procedure x1.   section x4 and status post ablation procedure on her heart.    Her mother had salivary gland cancer.  Her father had heart disease and diabetes.  Paternal grandfather had acute myocardial infarction and diabetes.  Paternal grandmother had diabetes.  She has 2 brothers alive and well.  Her children are alive and well.        ROS:   GEN: normal without any fever, night sweats or weight loss  HEENT: normal with no HA's, sore throat, stiff neck, changes in vision  CV: normal with no CP, SOB, PND, RIOS or orthopnea  PULM: normal with no SOB, cough, hemoptysis, sputum or pleuritic pain  GI: normal with no abdominal pain, nausea, vomiting, constipation, diarrhea, melanotic stools, BRBPR, or hematemesis  : normal  "with no hematuria, dysuria  BREAST: normal with no mass, discharge, pain  SKIN: normal with no rash, erythema, bruising, or swelling    Objective:   Vitals:  Blood pressure 117/76, pulse 65, temperature 97.2 °F (36.2 °C), temperature source Temporal, resp. rate 16, height 5' 2" (1.575 m), weight 89.1 kg (196 lb 8 oz), SpO2 98%.    Physical Examination:   GEN: no apparent distress, comfortable  HEAD: atraumatic and normocephalic  EYES: no pallor, no icterus  ENT: no pharyngeal erythema, external ears WNL; no nasal discharge  NECK: no masses, thyroid normal, trachea midline, no LAD/LN's, supple  CV: RRR with no murmur; normal pulse  CHEST: Normal respiratory effort; CTAB; normal breath sounds; no wheeze or crackles  ABDOM: nontender and nondistended; soft; no rebound/guarding  MUSC/Skeletal: ROM normal; no crepitus; joints normal;  EXTREM: no clubbing, cyanosis, inflammation or swelling  SKIN: no rashes, lesions, ulcers, petechiae   : no CVAT  NEURO: grossly intact; motor/sensory WNL;  no tremors  PSYCH: normal mood, affect and behavior  LYMPH: normal cervical, supraclavicular, axillary  LN's  BREASTS: She left the bra on for the exam, I did not examine breasts      Labs:   Lab Results   Component Value Date    WBC 5.0 11/20/2024    HGB 15.1 11/20/2024    HCT 44.2 11/20/2024    MCV 97.1 11/20/2024     11/20/2024    CMP  Sodium   Date Value Ref Range Status   11/20/2024 139 135 - 146 mmol/L Final     Potassium   Date Value Ref Range Status   11/20/2024 4.6 3.5 - 5.3 mmol/L Final     Chloride   Date Value Ref Range Status   11/20/2024 101 98 - 110 mmol/L Final     CO2   Date Value Ref Range Status   11/20/2024 30 20 - 32 mmol/L Final     Glucose   Date Value Ref Range Status   11/20/2024 99 65 - 99 mg/dL Final     Comment:                   Fasting reference interval          BUN   Date Value Ref Range Status   11/20/2024 14 7 - 25 mg/dL Final     Creatinine   Date Value Ref Range Status   11/20/2024 0.96 0.50 " - 0.99 mg/dL Final     Calcium   Date Value Ref Range Status   11/20/2024 9.2 8.6 - 10.2 mg/dL Final     Total Protein   Date Value Ref Range Status   11/20/2024 6.7 6.1 - 8.1 g/dL Final     Albumin   Date Value Ref Range Status   11/20/2024 4.2 3.6 - 5.1 g/dL Final     Total Bilirubin   Date Value Ref Range Status   11/20/2024 0.6 0.2 - 1.2 mg/dL Final     Alkaline Phosphatase   Date Value Ref Range Status   02/05/2024 38 31 - 125 U/L Final   11/23/2009 51 (L) 55 - 135 U/L Final     AST   Date Value Ref Range Status   11/20/2024 19 10 - 30 U/L Final     ALT   Date Value Ref Range Status   11/20/2024 17 6 - 29 U/L Final     Anion Gap   Date Value Ref Range Status   11/23/2009 15 10 - 20 mmol/L Final     eGFR if    Date Value Ref Range Status   11/23/2009 >60 >60 mL/min Final     Comment:     Estimated glomerular filtration rate (eGFR) is normalized to an  average body surface area of 1.73 square meters.  The calculation  used to obtain the eGFR is the adjusted MDRD equation, which factors  patient sex, age, race, and creatinine result.  Since race is unknown  in our information system, the eGFR values for -American  and Non--American patients are given for each creatinine  result.       eGFR if non    Date Value Ref Range Status   11/23/2009 >60 >60 mL/min Final     She is positive for 1 mutation in the [id number] variant in the MTHFR gene.  Homocystine is NL 7.2.  This finding is not usually associated with an associated increased risk of cardiovascular disease, thromboembolic events or adverse pregnancy outcomes.    Assessment:   (1) 44 y.o. female has had 6 miscarriages of her 10 pregnancies.  She does not have personal or family history of DVT, PE.    (2) I have advised her that I do not think the MTHFR mutation as reported, was a factor in her multiple miscarriages.  Noted the homocystine level is well in the normal range and is not increased.    (3) she does take  multiple vitamins long-term including B12, and she takes a multivitamin that contains folic acid.  I have recommended that she continue her vitamin supplements chronically, especially during her weight loss efforts with the Wegovy.    (4) a miscarriage can be secondary to abnormalities in the coagulation system, primarily with the development of thrombi within the placenta.  I discussed with her hypercoagulation syndrome.  I discussed with her the multiple clotting proteins in the blood involved in the clotting cascade,  and also involved in inhibiting the clotting cascade.  Deficiencies of clotting proteins, or mutations in clotting proteins or increases in clotting proteins can affect the balance of coagulation and can lead to bleeding or bruising tendencies or lead to DVT, PE miscarriage.    Also lupus anticoagulant, elevated levels of anticardiolipin antibodies and beta 2 glycoprotein can activate the clotting cascade.    (5) I recommended that we do a hypercoagulation evaluation in detail to see if we can identify an issue that would predispose her to DVT, PE, miscarriage event.  Some of these abnormalities can be hereditary and may affect her children and other members of the family.    I had placed orders for the hypercoagulation syndrome labs into the [facility] system.    However after discussing with her the evaluation, she decided not to go further in checking the clotting of the blood and did not agree to do the hypercoagulation evaluation.  I do not know if she was concerned about the expense, she does have insurance coverage.   I do not know if she just  did not want to go further in the evaluation, since she had completed her pregnancies and did not plan on having further children.    We discussed that obesity, smoking, use of hormonal medications including birth control pills and hormone replacement can lead to thromboembolic events in a minority of individuals.  Also traveling with relative immobility for  3 or 4 hours or more time, without getting up and moving about can also lead to blood pooling in the leg and potentially DVT in legs and PE.    I answered all of her questions to her satisfaction regarding the MTHFR, homocystine, miscarriage events from a hematology standpoint, and the hypercoagulation syndrome workup.  She did not feel that she needed to return here and I have not given her a follow-up appointment.    I did tell her that she could return at any time to discuss this further and if she change her mind I would place the orders for the hypercoagulation evaluation to evaluate her status further and to see if we could clarify if there are clotting issues here in her miscarriage history.    Thank you for letting me see her in consultation for yourself.    Umair Mendez MD  [facility]  2/21/25

## 2025-02-21 NOTE — LETTER
February 23, 2025        Wyatt Paulino, MICHAELA  [address]  [address]  [place] [address]             [facility] [facility]  [address]  [address]  [place] [address]  Phone: [phone]  Fax: [phone]   Patient: Ana Alcantara   MR Number: [id number]   YOB: 1980   Date of Visit: 2/21/2025       Dear Dr. Paulino:    Thank you for referring Ana Alcantara to me for evaluation. Below are the relevant portions of my assessment and plan of care.            If you have questions, please do not hesitate to call me. I look forward to following Ana along with you.    Sincerely,      ELI Preston MD           CC  No Recipients

## 2025-03-03 ENCOUNTER — PATIENT MESSAGE (OUTPATIENT)
Dept: FAMILY MEDICINE | Facility: CLINIC | Age: 45
End: 2025-03-03
Payer: COMMERCIAL

## 2025-03-04 RX ORDER — SEMAGLUTIDE 2.4 MG/.75ML
2.4 INJECTION, SOLUTION SUBCUTANEOUS
Qty: 3 ML | Refills: 1 | Status: SHIPPED | OUTPATIENT
Start: 2025-03-04

## 2025-03-07 DIAGNOSIS — E03.9 HYPOTHYROIDISM, UNSPECIFIED TYPE: ICD-10-CM

## 2025-03-07 RX ORDER — LEVOTHYROXINE SODIUM 25 UG/1
25 TABLET ORAL
Qty: 30 TABLET | Refills: 0 | Status: SHIPPED | OUTPATIENT
Start: 2025-03-07

## 2025-03-24 NOTE — PROGRESS NOTES
SUBJECTIVE:      Patient ID: Ana Alcantara is a 44 y.o. female.    Chief Complaint: Hypothyroidism    HPI  History of Present Illness    CHIEF COMPLAINT:  Ana presents today for f/u on hypothyroidism and weight    CHRONIC PAIN :  She reports daily achiness making it difficult to bend down and perform activities. Current flare symptoms include lower back and shoulder pain with body sensitivity to touch similar to having flu-like symptoms. The flare was triggered by attending a [employer] with limited sleep of 4 hours on both Friday and Saturday nights. Yesterday she was barely able to move and spent the entire day lying on the couch. She previously experienced a flare-up after participating in [employer] event which required approximately one week for recovery.    FATIGUE AND SLEEP:  She continues to experience persistent fatigue, feeling tired consistently unrelated to her recent flare. She denies difficulty sleeping.    MEDICATIONS:  She continues Synthroid with no noticeable changes in symptoms but denies worsening, Cymbalta with stable mood, Wegovy 2.4 dose for three weeks with one week of significant nausea during previous titration, and Metoprolol as prescribed by Dr. Knott.    WEIGHT MANAGEMENT:  She reports weight loss of 17 lbs from 207 lbs, now measuring 191 lbs on home scale when weighing first thing in the morning.    LABS:  Cholesterol levels were slightly elevated in November.         Past Surgical History:   Procedure Laterality Date    ABLATION  2024     SECTION      DILATION AND CURETTAGE OF UTERUS       Family History   Problem Relation Name Age of Onset    Heart disease Father      Diabetes Father      Diabetes Paternal Grandmother      Heart disease Paternal Grandfather        Social History     Socioeconomic History    Marital status:    Tobacco Use    Smoking status: Never     Passive exposure: Never    Smokeless tobacco: Never   Substance and Sexual  Activity    Alcohol use: Yes    Drug use: Never    Sexual activity: Yes     Social Drivers of Health     Financial Resource Strain: Medium Risk (2025)    Overall Financial Resource Strain (CARDIA)     Difficulty of Paying Living Expenses: Somewhat hard   Food Insecurity: No Food Insecurity (2025)    Hunger Vital Sign     Worried About Running Out of Food in the Last Year: Never true     Ran Out of Food in the Last Year: Never true   Transportation Needs: No Transportation Needs (2025)    PRAPARE - Transportation     Lack of Transportation (Medical): No     Lack of Transportation (Non-Medical): No   Physical Activity: Inactive (2025)    Exercise Vital Sign     Days of Exercise per Week: 0 days     Minutes of Exercise per Session: 0 min   Stress: No Stress Concern Present (2025)    [employer] [place] [employer] - Occupational Stress Questionnaire     Feeling of Stress : Not at all   Housing Stability: High Risk (2025)    Housing Stability Vital Sign     Unable to Pay for Housing in the Last Year: Yes     Number of Times Moved in the Last Year: 0     Homeless in the Last Year: No     Current Medications[1]  Review of patient's allergies indicates:  No Known Allergies   Past Medical History:   Diagnosis Date    Anxiety     Hypothyroidism     Pregnancy diabetes     Supraventricular arrhythmia      Past Surgical History:   Procedure Laterality Date    ABLATION  2024     SECTION      DILATION AND CURETTAGE OF UTERUS         Review of Systems   Constitutional:  Positive for fatigue. Negative for activity change, appetite change, chills, diaphoresis, fever and unexpected weight change.   HENT:  Negative for ear discharge, hearing loss, rhinorrhea, trouble swallowing and voice change.    Eyes:  Negative for photophobia, pain, discharge and visual disturbance.   Respiratory:  Negative for chest tightness, shortness of breath, wheezing and stridor.    Cardiovascular:   "Negative for chest pain and palpitations.   Gastrointestinal:  Negative for abdominal pain, blood in stool, constipation, diarrhea and vomiting.   Endocrine: Negative for cold intolerance, heat intolerance, polydipsia and polyuria.   Genitourinary:  Negative for difficulty urinating, dysuria, flank pain, hematuria and menstrual problem.   Musculoskeletal:  Positive for arthralgias. Negative for joint swelling, neck pain and neck stiffness.   Skin:  Negative for pallor.   Neurological:  Positive for weakness. Negative for speech difficulty and headaches.   Hematological:  Does not bruise/bleed easily.   Psychiatric/Behavioral:  Negative for confusion, dysphoric mood, self-injury, sleep disturbance and suicidal ideas. The patient is not nervous/anxious.       OBJECTIVE:      Vitals:    03/25/25 1051   BP: (P) 130/80   Pulse: 78   SpO2: 97%   Weight: 88 kg (194 lb)   Height: 5' 2" (1.575 m)     Physical Exam  Vitals and nursing note reviewed.   Constitutional:       General: She is not in acute distress.     Appearance: She is well-developed.   HENT:      Head: Normocephalic and atraumatic.      Right Ear: Tympanic membrane normal.      Left Ear: Tympanic membrane normal.      Nose: Nose normal.      Mouth/Throat:      Pharynx: Uvula midline.   Eyes:      General: Lids are normal.      Conjunctiva/sclera: Conjunctivae normal.      Pupils: Pupils are equal, round, and reactive to light.      Right eye: Pupil is round and reactive.      Left eye: Pupil is round and reactive.   Neck:      Thyroid: No thyromegaly.      Vascular: No JVD.      Trachea: Trachea normal.   Cardiovascular:      Rate and Rhythm: Normal rate and regular rhythm.      Pulses: Normal pulses.      Heart sounds: Normal heart sounds. No murmur heard.  Pulmonary:      Effort: Pulmonary effort is normal. No tachypnea or respiratory distress.      Breath sounds: Normal breath sounds. No wheezing, rhonchi or rales.   Abdominal:      General: Bowel sounds " are normal.      Palpations: Abdomen is soft.      Tenderness: There is no abdominal tenderness.   Musculoskeletal:         General: Normal range of motion.      Cervical back: Normal range of motion and neck supple.   Lymphadenopathy:      Cervical: No cervical adenopathy.   Skin:     General: Skin is warm and dry.      Findings: No rash.   Neurological:      Mental Status: She is alert and oriented to person, place, and time.   Psychiatric:         Mood and Affect: Mood normal.         Speech: Speech normal.         Behavior: Behavior normal. Behavior is cooperative.         Thought Content: Thought content normal.         Judgment: Judgment normal.       No visits with results within 1 Month(s) from this visit.   Latest known visit with results is:   Office Visit on 12/23/2024   Component Date Value Ref Range Status    Homocysteine, Cardiovascular 12/30/2024 7.2  <10.4 umol/L Final    Comment: Homocysteine is increased by functional deficiency of   folate or vitamin B12. Testing for methylmalonic acid   differentiates between these deficiencies. Other causes   of increased homocysteine include renal failure, folate   antagonists such as methotrexate and phenytoin, and   exposure to nitrous oxide.  Silvia BURGER, et al., [place] Intern Med. 1999;131(5):331-9.      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  This  "result is not associated with a  significantly increased risk for coronary artery disease,  venous thromboembolism, or adverse pregnancy outcome.     Laboratory testing supervised and results monitored by  Lamonte Dickinson, Ph.D., [facility], [facility], [facility].        Reduced methylenetetrahydrofolate reductase (MTHFR) enzyme  activity is a genetic risk factor for hyperhomocysteinemia,  especially when present with low serum folate levels. Two  common variants in the MTHFR gene result in reduced enzyme  activity. The "thermolabile" variant C677T [NM 429813.3:  c.665C>T (p.A222V)] and A7445A [c. 1286A>C (p.E429A)] occur  frequently in the general population.     Mild to moderate hyperhomocysteinemia has been identified as  a risk factor for coronary artery disease and venous  thromboembolism. Hyperhomocysteinemia is multifactorial,  involving a combination of genetic, physiolo                           gic and  environmental factors. Recent studies do not support the  previously described association of increased risk for  coronary artery disease and venous thromboembolism with mild  hyperhomocysteinemia caused by reduced MTHFR activity.  Therefore, the utility of MTHFR variant testing is uncertain  and is not recommended by The American College of Medical  Genetics and Genomics (ACMG) or the American Congress of  Obstetricians and Gynecologists (ACOG) in the evaluation of  venous thromboembolism or adverse pregnancy outcome.     Modest positive association has also been found between the  "thermolabile" variant of the MTHFR gene and many other  medical complications, such as recurrent pregnancy loss,  risk of offspring with neural tube defects, neuropsychiatric  disease, and chemotherapy toxicity. Increased risk of  coronary artery disease, venous thromboembolism and  increased plasma homocysteine can be caused by a variety of  genetic and non-genetic factors not screened for by thi                           s  assay. If indicated " by personal or family history of  thromboembolism, consider additional testing such as plasma  homocysteine levels, factor V Leiden and prothrombin gene  mutations.     The C677T and V9067Q variants are detected by amplification  of the selected regions of the MTHFR gene by polymerase  chain reaction (PCR) and fluorescent probes hybridization to  the targeted regions, followed by melting curve analysis  with a real time PCR system. Although rare, false positive  or false negative results may occur. All results should be  interpreted in context of clinical findings, relevant  history, and other laboratory data. Health care providers,  please contact your local [facility] genetic  counselor or call [phone] for assistance  with interpretation of these results.     For additional information, please refer to  http://education.[employer]/faq/FAQ29 (This link  is being provided for informational/educational purposes  only.)     Thi                           s test was developed and its analytical performance  characteristics have been determined by [facility]  [facility]. It has not been  cleared or approved by the U.S. Food and Drug  Administration. This assay has been validated pursuant to  the CLIA regulations and is used for clinical purposes.        Assessment:       1. Hypothyroidism, unspecified type    2. BMI 35.0-35.9,adult    3. Anxiety with depression    4. Chronic fatigue        Plan:       Hypothyroidism, unspecified type  -     TSH w/reflex to FT4; Future; Expected date: 03/25/2025    BMI 35.0-35.9,adult  -     TSH w/reflex to FT4; Future; Expected date: 03/25/2025  -     WEGOVY 2.4 mg/0.75 mL PnIj; Inject 2.4 mg into the skin every 7 days.  Dispense: 3 mL; Refill: 1    Anxiety with depression  -     DULoxetine (CYMBALTA) 30 MG capsule; Take 1 capsule (30 mg total) by mouth once daily.  Dispense: 90 capsule; Refill: 1    Chronic  fatigue  -     DULoxetine (CYMBALTA) 30 MG capsule; Take 1 capsule (30 mg total) by mouth once daily.  Dispense: 90 capsule; Refill: 1  -     WEGOVY 2.4 mg/0.75 mL PnIj; Inject 2.4 mg into the skin every 7 days.  Dispense: 3 mL; Refill: 1      Assessment & Plan    CHRONIC FATIGUE:  - Assessed symptoms consistent with fibromyalgia flare-up, likely exacerbated by recent stress and lack of sleep.  - Explained fibromyalgia diagnosis process and typical symptoms.  - Ana reports being in the middle of a flare-up with widespread pain, particularly in lower back and shoulders.  - Pain is described as achy and sensitive to touch, exacerbated by lack of sleep and stress.  - Ana experiences flare-ups after stressful situations, with bilateral symptoms including joint aches, back aches, shoulders, and elbows without diagnosis of rheumatoid arthritis.  - Performed physical exam, finding tenderness in lower back and other areas.  - Ana reports symptoms usually resolve within a week  - Suggested trying Metanex, a medical food supplement containing L-methylfolate and B vitamins, which has shown benefits for chronic pain conditions.  - Recommend dosage is 1 capsule twice daily for at least 3 months.  - Discussed the option of increasing Cymbalta dosage for  management, but the patient prefers to try the vitamin supplement first.  - Considered options for managing chronic pain.  - Started Metanx 1 capsule twice daily through [facility] program   - Continued Cymbalta with 90-day prescription with 1 refill, with possible increase in dosage if needed for pain management.  - Acknowledged the patient's chronic pain issues and discussed treatment options.      HYPERLIPIDEMIA:  - Ana's cholesterol was slightly elevated in November.  - Anticipate cholesterol improvement with weight loss     DEPRESSION AND ANXIETY MANAGEMENT:  - Continued Cymbalta with 90-day prescription with 1 refill.  - Ana reports stable mood and  emotions while on current medication.  - Discussed the history of Cymbalta prescription, which was initially given for anxiety related to tachycardia rather than depression.    HYPOTHYROIDISM:  - Continued Synthroid with no changes pending thyroid test results.  - Ordered TSH blood test with reflex to additional thyroid tests if results are abnormal, considering history of elevated levels.  -  HYPERTENSION:  - Ana is taking Metoprolol prescribed by Dr. Knott.    WEIGHT MANAGEMENT:  - Evaluated weight loss progress, noting significant improvement in BMI.  - Continued Wegovy 2.4 mg dose.         Follow up in about 3 months (around 7/2/2025) for hypothyroidism .  This note was generated with the assistance of ambient listening technology. Verbal consent was obtained by the patient and accompanying visitor(s) for the recording of patient appointment to facilitate this note. I attest to having reviewed and edited the generated note for accuracy, though some syntax or spelling errors may persist. Please contact the author of this note for any clarification.        3/25/2025 MYLES Nassar, ANUPAMAP             [1]   Current Outpatient Medications   Medication Sig Dispense Refill    metoprolol tartrate (LOPRESSOR) 25 MG tablet Take 25 mg by mouth 2 (two) times daily.      SYNTHROID 25 mcg tablet TAKE 1 TABLET BY MOUTH ONCE DAILY BEFORE BREAKFAST 30 tablet 0    DULoxetine (CYMBALTA) 30 MG capsule Take 1 capsule (30 mg total) by mouth once daily. 90 capsule 1    WEGOVY 2.4 mg/0.75 mL PnIj Inject 2.4 mg into the skin every 7 days. 3 mL 1     No current facility-administered medications for this visit.

## 2025-03-25 ENCOUNTER — OFFICE VISIT (OUTPATIENT)
Dept: FAMILY MEDICINE | Facility: CLINIC | Age: 45
End: 2025-03-25
Payer: COMMERCIAL

## 2025-03-25 VITALS — WEIGHT: 194 LBS | HEIGHT: 62 IN | OXYGEN SATURATION: 97 % | HEART RATE: 78 BPM | BODY MASS INDEX: 35.7 KG/M2

## 2025-03-25 DIAGNOSIS — R53.82 CHRONIC FATIGUE: ICD-10-CM

## 2025-03-25 DIAGNOSIS — F41.8 ANXIETY WITH DEPRESSION: ICD-10-CM

## 2025-03-25 DIAGNOSIS — E03.9 HYPOTHYROIDISM, UNSPECIFIED TYPE: Primary | ICD-10-CM

## 2025-03-25 PROCEDURE — 1160F RVW MEDS BY RX/DR IN RCRD: CPT | Mod: CPTII,S$GLB,, | Performed by: NURSE PRACTITIONER

## 2025-03-25 PROCEDURE — 1159F MED LIST DOCD IN RCRD: CPT | Mod: CPTII,S$GLB,, | Performed by: NURSE PRACTITIONER

## 2025-03-25 PROCEDURE — 99214 OFFICE O/P EST MOD 30 MIN: CPT | Mod: S$GLB,,, | Performed by: NURSE PRACTITIONER

## 2025-03-25 PROCEDURE — 3008F BODY MASS INDEX DOCD: CPT | Mod: CPTII,S$GLB,, | Performed by: NURSE PRACTITIONER

## 2025-03-25 RX ORDER — SEMAGLUTIDE 2.4 MG/.75ML
2.4 INJECTION, SOLUTION SUBCUTANEOUS
Qty: 3 ML | Refills: 1 | Status: SHIPPED | OUTPATIENT
Start: 2025-03-25

## 2025-03-25 RX ORDER — DULOXETIN HYDROCHLORIDE 30 MG/1
30 CAPSULE, DELAYED RELEASE ORAL DAILY
Qty: 90 CAPSULE | Refills: 1 | Status: SHIPPED | OUTPATIENT
Start: 2025-03-25

## 2025-03-26 ENCOUNTER — RESULTS FOLLOW-UP (OUTPATIENT)
Dept: FAMILY MEDICINE | Facility: CLINIC | Age: 45
End: 2025-03-26

## 2025-03-26 DIAGNOSIS — E03.9 HYPOTHYROIDISM, UNSPECIFIED TYPE: ICD-10-CM

## 2025-03-26 LAB
T4 FREE SERPL-MCNC: 1.2 NG/DL (ref 0.8–1.8)
TSH SERPL-ACNC: 0.23 MIU/L

## 2025-03-26 RX ORDER — LEVOTHYROXINE SODIUM 25 UG/1
25 TABLET ORAL
Qty: 30 TABLET | Refills: 0 | Status: SHIPPED | OUTPATIENT
Start: 2025-03-26

## 2025-06-11 DIAGNOSIS — E03.9 HYPOTHYROIDISM, UNSPECIFIED TYPE: ICD-10-CM

## 2025-06-11 RX ORDER — LEVOTHYROXINE SODIUM 25 UG/1
25 TABLET ORAL
Qty: 30 TABLET | Refills: 0 | Status: SHIPPED | OUTPATIENT
Start: 2025-06-11

## 2025-06-24 NOTE — PROGRESS NOTES
SUBJECTIVE:      Patient ID: Ana Alcantara is a 44 y.o. female.    Chief Complaint: Hypothyroidism    HPI  History of Present Illness    CHIEF COMPLAINT:  Ana presents today for follow up of fatigue and weight management.    FATIGUE:  She reports severe, long-standing fatigue significantly impacting daily activities. She describes feeling extremely tired to the point where getting to the appointment was challenging. She experiences concurrent brain fog, stating she cannot focus or think clearly due to exhaustion. She continues to feel tired enough to nap but is unable to do so, which she finds unusual given her previous daily napping habits.    SLEEP:  She reports significant sleep disturbances characterized by difficulty initiating and maintaining sleep. She describes tossing and turning at night, with last night's sleep onset occurring around 4:15 AM. She experiences considerable morning fatigue, finding it extremely challenging to wake up when her alarm sounds. This is unusual for her, as she previously could fall asleep easily at any time. She completed a home sleep study over a year ago but does not recall the results.    WEIGHT MANAGEMENT:  She reports continued weight loss with a plateau over the past month. She is currently taking Wegovy at the highest dose with good medication tolerance and experiencing decreased appetite. She reports regular daily bowel movements without constipation. She expresses hope that continued weight loss will improve overall well-being.    MUSCULOSKELETAL:  She reports constant thigh muscle soreness that feels like having run extensive miles. She experiences significant difficulty transitioning from sitting to standing position, noting it takes considerable effort to get up. The leg pain is persistent with increased severity compared to previous experiences. She denies prior rheumatology consultation for these symptoms.    MENTAL HEALTH:  She reports stable mental health  status. She denies experiencing any recent emotional breakdowns, irritability, or aggressive outbursts. Her current mental state is well-managed and without significant distress.    MEDICATIONS:  She continues to take Cymbalta as previously prescribed and thyroid medication, with reported variable energy levels associated with the thyroid medication.         Past Surgical History:   Procedure Laterality Date    ABLATION  2024     SECTION      DILATION AND CURETTAGE OF UTERUS  2006     Family History   Problem Relation Name Age of Onset    Heart disease Father      Diabetes Father      Diabetes Paternal Grandmother      Heart disease Paternal Grandfather        Social History     Socioeconomic History    Marital status:    Tobacco Use    Smoking status: Never     Passive exposure: Never    Smokeless tobacco: Never   Substance and Sexual Activity    Alcohol use: Yes    Drug use: Never    Sexual activity: Yes     Social Drivers of Health     Financial Resource Strain: Medium Risk (2025)    Overall Financial Resource Strain (CARDIA)     Difficulty of Paying Living Expenses: Somewhat hard   Food Insecurity: No Food Insecurity (2025)    Hunger Vital Sign     Worried About Running Out of Food in the Last Year: Never true     Ran Out of Food in the Last Year: Never true   Transportation Needs: No Transportation Needs (2025)    PRAPARE - Transportation     Lack of Transportation (Medical): No     Lack of Transportation (Non-Medical): No   Physical Activity: Inactive (2025)    Exercise Vital Sign     Days of Exercise per Week: 0 days     Minutes of Exercise per Session: 0 min   Stress: No Stress Concern Present (2025)    [employer] - Occupational Stress Questionnaire     Feeling of Stress : Not at all   Housing Stability: High Risk (2025)    Housing Stability Vital Sign     Unable to Pay for Housing in the Last Year: Yes     Number of Times Moved in  "the Last Year: 0     Homeless in the Last Year: No     Current Medications[1]  Review of patient's allergies indicates:  No Known Allergies   Past Medical History:   Diagnosis Date    Anxiety     Hypothyroidism     Pregnancy diabetes     Supraventricular arrhythmia      Past Surgical History:   Procedure Laterality Date    ABLATION  2024     SECTION      DILATION AND CURETTAGE OF UTERUS         Review of Systems   Constitutional:  Positive for fatigue. Negative for activity change, appetite change, chills, diaphoresis and unexpected weight change.   HENT:  Negative for ear discharge, hearing loss, rhinorrhea, trouble swallowing and voice change.    Eyes:  Negative for photophobia, pain, discharge and visual disturbance.   Respiratory:  Negative for chest tightness, shortness of breath, wheezing and stridor.    Cardiovascular:  Negative for chest pain and palpitations.   Gastrointestinal:  Negative for abdominal pain, blood in stool, constipation, diarrhea and vomiting.   Endocrine: Negative for cold intolerance, heat intolerance, polydipsia and polyuria.   Genitourinary:  Negative for difficulty urinating, dysuria, flank pain, hematuria and menstrual problem.   Musculoskeletal:  Positive for arthralgias. Negative for joint swelling, neck pain and neck stiffness.   Skin:  Negative for pallor.   Neurological:  Positive for weakness. Negative for dizziness, speech difficulty and headaches.   Hematological:  Does not bruise/bleed easily.   Psychiatric/Behavioral:  Negative for confusion, dysphoric mood, self-injury, sleep disturbance and suicidal ideas. The patient is not nervous/anxious.       OBJECTIVE:      Vitals:    25 1044   BP: (P) 120/80   Pulse: 66   SpO2: 98%   Weight: 83.9 kg (185 lb)   Height: 5' 2" (1.575 m)     Physical Exam  Vitals and nursing note reviewed.   Constitutional:       General: She is not in acute distress.     Appearance: She is well-developed.   HENT:      Head: " Normocephalic and atraumatic.      Right Ear: Tympanic membrane normal.      Left Ear: Tympanic membrane normal.      Nose: Nose normal.      Mouth/Throat:      Pharynx: Uvula midline.   Eyes:      General: Lids are normal.      Conjunctiva/sclera: Conjunctivae normal.      Pupils: Pupils are equal, round, and reactive to light.      Right eye: Pupil is round and reactive.      Left eye: Pupil is round and reactive.   Neck:      Thyroid: No thyromegaly.      Vascular: No JVD.      Trachea: Trachea normal.   Cardiovascular:      Rate and Rhythm: Normal rate and regular rhythm.      Pulses: Normal pulses.      Heart sounds: Normal heart sounds.   Pulmonary:      Effort: Pulmonary effort is normal. No tachypnea or respiratory distress.      Breath sounds: Normal breath sounds. No wheezing, rhonchi or rales.   Abdominal:      General: Bowel sounds are normal.      Palpations: Abdomen is soft.      Tenderness: There is no abdominal tenderness.   Musculoskeletal:         General: Normal range of motion.      Cervical back: Normal range of motion and neck supple.      Right lower leg: No edema.      Left lower leg: No edema.   Lymphadenopathy:      Cervical: No cervical adenopathy.   Skin:     General: Skin is warm and dry.      Findings: No rash.   Neurological:      Mental Status: She is alert and oriented to person, place, and time.   Psychiatric:         Mood and Affect: Mood normal.         Speech: Speech normal.         Behavior: Behavior normal. Behavior is cooperative.         Thought Content: Thought content normal.         Judgment: Judgment normal.       No visits with results within 1 Month(s) from this visit.   Latest known visit with results is:   Office Visit on 03/25/2025   Component Date Value Ref Range Status    TSH w/reflex to FT4 03/25/2025 0.23 (L)  mIU/L Final    Comment:           Reference Range                         > or = 20 Years  0.40-4.50                              Pregnancy Ranges             First trimester    0.26-2.66            Second trimester   0.55-2.73            Third trimester    0.43-2.91      T4, Free 03/25/2025 1.2  0.8 - 1.8 ng/dL Final      Assessment:       1. Anxiety with depression    2. Hypothyroidism, unspecified type    3. BMI 35.0-35.9,adult    4. Chronic fatigue    5. Hyperlipidemia, unspecified hyperlipidemia type    6. Vitamin D deficiency    7. Arthralgia, unspecified joint    8. Anemia, unspecified type        Plan:       Anxiety with depression  -     CBC Auto Differential; Future; Expected date: 07/09/2025  -     Iron; Future; Expected date: 06/25/2025  -     Ferritin; Future; Expected date: 06/25/2025  -     Magnesium; Future; Expected date: 06/25/2025  -     Vitamin D; Future; Expected date: 07/09/2025  -     Vitamin B12; Future; Expected date: 06/25/2025  -     Rheumatoid Factor; Future; Expected date: 06/25/2025    Hypothyroidism, unspecified type  -     TSH w/reflex to FT4; Future; Expected date: 06/25/2025  -     Comprehensive Metabolic Panel; Future; Expected date: 06/25/2025  -     Lipid Panel; Future; Expected date: 06/25/2025    BMI 35.0-35.9,adult  -     WEGOVY 2.4 mg/0.75 mL PnIj; Inject 2.4 mg into the skin every 7 days.  Dispense: 3 mL; Refill: 3    Chronic fatigue  -     WEGOVY 2.4 mg/0.75 mL PnIj; Inject 2.4 mg into the skin every 7 days.  Dispense: 3 mL; Refill: 3  -     CBC Auto Differential; Future; Expected date: 07/09/2025  -     Iron; Future; Expected date: 06/25/2025  -     Ferritin; Future; Expected date: 06/25/2025  -     Magnesium; Future; Expected date: 06/25/2025  -     Vitamin D; Future; Expected date: 07/09/2025  -     Vitamin B12; Future; Expected date: 06/25/2025  -     Rheumatoid Factor; Future; Expected date: 06/25/2025    Hyperlipidemia, unspecified hyperlipidemia type  -     TSH w/reflex to FT4; Future; Expected date: 06/25/2025  -     Comprehensive Metabolic Panel; Future; Expected date: 06/25/2025  -     Lipid Panel; Future; Expected  date: 06/25/2025    Vitamin D deficiency  -     CBC Auto Differential; Future; Expected date: 07/09/2025  -     Iron; Future; Expected date: 06/25/2025  -     Ferritin; Future; Expected date: 06/25/2025  -     Magnesium; Future; Expected date: 06/25/2025  -     Vitamin D; Future; Expected date: 07/09/2025  -     Vitamin B12; Future; Expected date: 06/25/2025  -     Rheumatoid Factor; Future; Expected date: 06/25/2025    Arthralgia, unspecified joint  -     CBC Auto Differential; Future; Expected date: 07/09/2025  -     Iron; Future; Expected date: 06/25/2025  -     Ferritin; Future; Expected date: 06/25/2025  -     Magnesium; Future; Expected date: 06/25/2025  -     Vitamin D; Future; Expected date: 07/09/2025  -     Vitamin B12; Future; Expected date: 06/25/2025  -     Rheumatoid Factor; Future; Expected date: 06/25/2025    Anemia, unspecified type  -     CBC Auto Differential; Future; Expected date: 07/09/2025  -     Iron; Future; Expected date: 06/25/2025  -     Ferritin; Future; Expected date: 06/25/2025  -     Magnesium; Future; Expected date: 06/25/2025  -     Vitamin D; Future; Expected date: 07/09/2025  -     Vitamin B12; Future; Expected date: 06/25/2025  -     Rheumatoid Factor; Future; Expected date: 06/25/2025      Assessment & Plan    # FATIGUE SYNDROME:  - Monitored the patient's severe fatigue, which is consuming her life - she struggles to wake up in the morning and feels tired all day.  - Fatigue persists at severe levels despite weight loss and thyroid medication  - Continuing Cymbalta for symptom management.  - Will obtain sleep study results to determine if sleep apnea is contributing to fatigue.  - If sleep apnea is ruled out or treated without improvement, will consider rheumatology referral for further investigation of persistent fatigue.    ## INSOMNIA:  - Ana reports tossing and turning all night with inability to fall asleep until early morning.  - Insomnia persists despite weight loss  and thyroid medication.  - Sleep apnea is being considered as a potential cause for these significant sleep issues.  - Will review previous sleep study results to determine appropriate treatment approach.  Recommend remfresh otc    ## OBSTRUCTIVE SLEEP APNEA:  -- Ordered copy of previous sleep study results from Dr. Pantoja's office.  - Will implement treatment if sleep study results confirm sleep apnea.    ## OVERWEIGHT:  - Ana has lost weight but reports no further weight loss in over a month despite being on the highest dose of Wegovy.  - Continuing Wegovy for weight management, which is covered by insurance.  - Discussed potential impact of weight on fatigue and insomnia symptoms.    ## HYPOTHYROIDISM:  - Ana is taking thyroid medication, but fatigue and insomnia persist despite treatment.  - Continuing thyroid medication while investigating other potential causes of ongoing symptoms.         Follow up in about 4 months (around 10/25/2025) for hypothyroidism .  This note was generated with the assistance of ambient listening technology. Verbal consent was obtained by the patient and accompanying visitor(s) for the recording of patient appointment to facilitate this note. I attest to having reviewed and edited the generated note for accuracy, though some syntax or spelling errors may persist. Please contact the author of this note for any clarification.        6/25/2025 MYLES Nassar, ANUPAMAP             [1]   Current Outpatient Medications   Medication Sig Dispense Refill    DULoxetine (CYMBALTA) 30 MG capsule Take 1 capsule (30 mg total) by mouth once daily. 90 capsule 1    metoprolol tartrate (LOPRESSOR) 25 MG tablet Take 25 mg by mouth 2 (two) times daily.      SYNTHROID 25 mcg tablet Take 1 tablet (25 mcg total) by mouth before breakfast. 6 days a week 30 tablet 0    WEGOVY 2.4 mg/0.75 mL PnIj Inject 2.4 mg into the skin every 7 days. 3 mL 3     No current facility-administered medications for this  visit.

## 2025-06-25 ENCOUNTER — OFFICE VISIT (OUTPATIENT)
Dept: FAMILY MEDICINE | Facility: CLINIC | Age: 45
End: 2025-06-25
Payer: COMMERCIAL

## 2025-06-25 VITALS — HEIGHT: 62 IN | HEART RATE: 66 BPM | BODY MASS INDEX: 34.04 KG/M2 | OXYGEN SATURATION: 98 % | WEIGHT: 185 LBS

## 2025-06-25 DIAGNOSIS — F41.8 ANXIETY WITH DEPRESSION: Primary | ICD-10-CM

## 2025-06-25 DIAGNOSIS — R53.82 CHRONIC FATIGUE: ICD-10-CM

## 2025-06-25 DIAGNOSIS — E78.5 HYPERLIPIDEMIA, UNSPECIFIED HYPERLIPIDEMIA TYPE: ICD-10-CM

## 2025-06-25 DIAGNOSIS — E55.9 VITAMIN D DEFICIENCY: ICD-10-CM

## 2025-06-25 DIAGNOSIS — D64.9 ANEMIA, UNSPECIFIED TYPE: ICD-10-CM

## 2025-06-25 DIAGNOSIS — E03.9 HYPOTHYROIDISM, UNSPECIFIED TYPE: ICD-10-CM

## 2025-06-25 DIAGNOSIS — M25.50 ARTHRALGIA, UNSPECIFIED JOINT: ICD-10-CM

## 2025-06-25 PROCEDURE — 3008F BODY MASS INDEX DOCD: CPT | Mod: CPTII,S$GLB,, | Performed by: NURSE PRACTITIONER

## 2025-06-25 PROCEDURE — 99214 OFFICE O/P EST MOD 30 MIN: CPT | Mod: S$GLB,,, | Performed by: NURSE PRACTITIONER

## 2025-06-25 PROCEDURE — 1160F RVW MEDS BY RX/DR IN RCRD: CPT | Mod: CPTII,S$GLB,, | Performed by: NURSE PRACTITIONER

## 2025-06-25 PROCEDURE — 1159F MED LIST DOCD IN RCRD: CPT | Mod: CPTII,S$GLB,, | Performed by: NURSE PRACTITIONER

## 2025-06-25 RX ORDER — SEMAGLUTIDE 2.4 MG/.75ML
2.4 INJECTION, SOLUTION SUBCUTANEOUS
Qty: 3 ML | Refills: 3 | Status: SHIPPED | OUTPATIENT
Start: 2025-06-25

## 2025-06-26 LAB
25(OH)D3+25(OH)D2 SERPL-MCNC: 50 NG/ML (ref 30–100)
ALBUMIN SERPL-MCNC: 4.7 G/DL (ref 3.6–5.1)
ALBUMIN/GLOB SERPL: 1.8 (CALC) (ref 1–2.5)
ALP SERPL-CCNC: 50 U/L (ref 31–125)
ALT SERPL-CCNC: 13 U/L (ref 6–29)
AST SERPL-CCNC: 15 U/L (ref 10–30)
BASOPHILS # BLD AUTO: 31 CELLS/UL (ref 0–200)
BASOPHILS NFR BLD AUTO: 0.7 %
BILIRUB SERPL-MCNC: 0.6 MG/DL (ref 0.2–1.2)
BUN SERPL-MCNC: 15 MG/DL (ref 7–25)
BUN/CREAT SERPL: NORMAL (CALC) (ref 6–22)
CALCIUM SERPL-MCNC: 9.3 MG/DL (ref 8.6–10.2)
CHLORIDE SERPL-SCNC: 99 MMOL/L (ref 98–110)
CHOLEST SERPL-MCNC: 272 MG/DL
CHOLEST/HDLC SERPL: 4.9 (CALC)
CO2 SERPL-SCNC: 28 MMOL/L (ref 20–32)
CREAT SERPL-MCNC: 0.87 MG/DL (ref 0.5–0.99)
EGFR: 84 ML/MIN/1.73M2
EOSINOPHIL # BLD AUTO: 150 CELLS/UL (ref 15–500)
EOSINOPHIL NFR BLD AUTO: 3.4 %
ERYTHROCYTE [DISTWIDTH] IN BLOOD BY AUTOMATED COUNT: 11.9 % (ref 11–15)
FERRITIN SERPL-MCNC: 17 NG/ML (ref 16–232)
GLOBULIN SER CALC-MCNC: 2.6 G/DL (CALC) (ref 1.9–3.7)
GLUCOSE SERPL-MCNC: 86 MG/DL (ref 65–99)
HCT VFR BLD AUTO: 45 % (ref 35–45)
HDLC SERPL-MCNC: 55 MG/DL
HGB BLD-MCNC: 14.9 G/DL (ref 11.7–15.5)
IRON SERPL-MCNC: 300 MCG/DL (ref 40–190)
LDLC SERPL CALC-MCNC: 189 MG/DL (CALC)
LYMPHOCYTES # BLD AUTO: 1298 CELLS/UL (ref 850–3900)
LYMPHOCYTES NFR BLD AUTO: 29.5 %
MAGNESIUM SERPL-MCNC: 2.4 MG/DL (ref 1.5–2.5)
MCH RBC QN AUTO: 32.3 PG (ref 27–33)
MCHC RBC AUTO-ENTMCNC: 33.1 G/DL (ref 32–36)
MCV RBC AUTO: 97.6 FL (ref 80–100)
MONOCYTES # BLD AUTO: 387 CELLS/UL (ref 200–950)
MONOCYTES NFR BLD AUTO: 8.8 %
NEUTROPHILS # BLD AUTO: 2534 CELLS/UL (ref 1500–7800)
NEUTROPHILS NFR BLD AUTO: 57.6 %
NONHDLC SERPL-MCNC: 217 MG/DL (CALC)
PLATELET # BLD AUTO: 212 THOUSAND/UL (ref 140–400)
PMV BLD REES-ECKER: 9.7 FL (ref 7.5–12.5)
POTASSIUM SERPL-SCNC: 4 MMOL/L (ref 3.5–5.3)
PROT SERPL-MCNC: 7.3 G/DL (ref 6.1–8.1)
RBC # BLD AUTO: 4.61 MILLION/UL (ref 3.8–5.1)
RHEUMATOID FACT SERPL-ACNC: <10 IU/ML
SODIUM SERPL-SCNC: 138 MMOL/L (ref 135–146)
T4 FREE SERPL-MCNC: 1.3 NG/DL (ref 0.8–1.8)
TRIGL SERPL-MCNC: 138 MG/DL
TSH SERPL-ACNC: 0.39 MIU/L
VIT B12 SERPL-MCNC: 1823 PG/ML (ref 200–1100)
WBC # BLD AUTO: 4.4 THOUSAND/UL (ref 3.8–10.8)

## 2025-06-30 ENCOUNTER — RESULTS FOLLOW-UP (OUTPATIENT)
Dept: FAMILY MEDICINE | Facility: CLINIC | Age: 45
End: 2025-06-30
Payer: COMMERCIAL

## 2025-06-30 DIAGNOSIS — E78.5 HYPERLIPIDEMIA, UNSPECIFIED HYPERLIPIDEMIA TYPE: Primary | ICD-10-CM

## 2025-06-30 RX ORDER — ATORVASTATIN CALCIUM 10 MG/1
10 TABLET, FILM COATED ORAL DAILY
Qty: 90 TABLET | Refills: 0 | Status: SHIPPED | OUTPATIENT
Start: 2025-06-30

## 2025-06-30 NOTE — TELEPHONE ENCOUNTER
Lipitor sent, she will need repeat labs and a f/u with Charley in 3mo. She can repeat the iron at that time as well

## 2025-07-02 ENCOUNTER — PATIENT MESSAGE (OUTPATIENT)
Dept: FAMILY MEDICINE | Facility: CLINIC | Age: 45
End: 2025-07-02
Payer: COMMERCIAL

## 2025-07-02 ENCOUNTER — TELEPHONE (OUTPATIENT)
Dept: FAMILY MEDICINE | Facility: CLINIC | Age: 45
End: 2025-07-02
Payer: COMMERCIAL

## 2025-07-02 DIAGNOSIS — R53.82 CHRONIC FATIGUE: ICD-10-CM

## 2025-07-02 RX ORDER — SEMAGLUTIDE 2.4 MG/.75ML
2.4 INJECTION, SOLUTION SUBCUTANEOUS
Qty: 3 ML | Refills: 3 | Status: SHIPPED | OUTPATIENT
Start: 2025-07-02

## 2025-07-07 ENCOUNTER — PATIENT MESSAGE (OUTPATIENT)
Dept: FAMILY MEDICINE | Facility: CLINIC | Age: 45
End: 2025-07-07
Payer: COMMERCIAL

## 2025-07-18 ENCOUNTER — PATIENT MESSAGE (OUTPATIENT)
Dept: FAMILY MEDICINE | Facility: CLINIC | Age: 45
End: 2025-07-18
Payer: COMMERCIAL

## 2025-07-18 DIAGNOSIS — E03.9 HYPOTHYROIDISM, UNSPECIFIED TYPE: Primary | ICD-10-CM

## 2025-07-21 ENCOUNTER — TELEPHONE (OUTPATIENT)
Dept: FAMILY MEDICINE | Facility: CLINIC | Age: 45
End: 2025-07-21
Payer: COMMERCIAL

## 2025-07-21 RX ORDER — LEVOTHYROXINE SODIUM 25 UG/1
25 TABLET ORAL
Qty: 90 TABLET | Refills: 0 | Status: SHIPPED | OUTPATIENT
Start: 2025-07-21

## 2025-07-21 NOTE — TELEPHONE ENCOUNTER
I've called all numbers listed on that [employer] pap supply order and they never , tried both numbers, on hold > 10 minutes, will wait to see if they reached out to Ana but we may have to pivot to a different co for supplies?

## 2025-07-21 NOTE — TELEPHONE ENCOUNTER
Can you ck on the wegovy and the cpap that we placed the order for a few weeks ago and update the pt. Unithroid sent in place of synthorid and we have a coupon card

## 2025-08-28 DIAGNOSIS — E03.9 HYPOTHYROIDISM, UNSPECIFIED TYPE: ICD-10-CM

## 2025-08-28 RX ORDER — LEVOTHYROXINE SODIUM 25 UG/1
25 TABLET ORAL
Qty: 90 TABLET | Refills: 0 | Status: SHIPPED | OUTPATIENT
Start: 2025-08-28